# Patient Record
Sex: FEMALE | Race: WHITE | Employment: FULL TIME | URBAN - METROPOLITAN AREA
[De-identification: names, ages, dates, MRNs, and addresses within clinical notes are randomized per-mention and may not be internally consistent; named-entity substitution may affect disease eponyms.]

---

## 2017-09-15 ENCOUNTER — HOSPITAL ENCOUNTER (OUTPATIENT)
Dept: GENERAL RADIOLOGY | Age: 62
Discharge: HOME | End: 2017-09-15
Attending: NURSE PRACTITIONER | Admitting: NURSE PRACTITIONER

## 2017-09-15 DIAGNOSIS — R06.02 BREATH SHORTNESS: ICD-10-CM

## 2017-09-15 DIAGNOSIS — R05.9 COUGH: ICD-10-CM

## 2017-10-02 ENCOUNTER — HOSPITAL ENCOUNTER (OUTPATIENT)
Dept: PULMONOLOGY | Age: 62
Discharge: HOME | End: 2017-10-02
Attending: NURSE PRACTITIONER | Admitting: NURSE PRACTITIONER

## 2022-01-01 ENCOUNTER — CLINICAL DOCUMENTATION (OUTPATIENT)
Dept: ONCOLOGY | Age: 67
End: 2022-01-01

## 2022-01-01 ENCOUNTER — ANESTHESIA EVENT (OUTPATIENT)
Dept: ENDOSCOPY | Age: 67
DRG: 374 | End: 2022-01-01
Payer: COMMERCIAL

## 2022-01-01 ENCOUNTER — HOSPITAL ENCOUNTER (INPATIENT)
Age: 67
LOS: 7 days | Discharge: HOME HEALTH | DRG: 374 | End: 2022-03-31
Attending: EMERGENCY MEDICINE | Admitting: INTERNAL MEDICINE
Payer: COMMERCIAL

## 2022-01-01 ENCOUNTER — APPOINTMENT (OUTPATIENT)
Dept: INTERVENTIONAL RADIOLOGY/VASCULAR | Age: 67
DRG: 374 | End: 2022-01-01
Payer: COMMERCIAL

## 2022-01-01 ENCOUNTER — HOSPITAL ENCOUNTER (OUTPATIENT)
Dept: PET IMAGING | Age: 67
Discharge: HOME | End: 2022-04-13
Payer: COMMERCIAL

## 2022-01-01 ENCOUNTER — TELEPHONE (OUTPATIENT)
Dept: ONCOLOGY | Age: 67
End: 2022-01-01

## 2022-01-01 ENCOUNTER — APPOINTMENT (OUTPATIENT)
Dept: GENERAL RADIOLOGY | Age: 67
DRG: 374 | End: 2022-01-01
Payer: COMMERCIAL

## 2022-01-01 ENCOUNTER — OFFICE VISIT (OUTPATIENT)
Dept: ONCOLOGY | Age: 67
End: 2022-01-01
Payer: COMMERCIAL

## 2022-01-01 ENCOUNTER — APPOINTMENT (OUTPATIENT)
Dept: ULTRASOUND IMAGING | Age: 67
DRG: 374 | End: 2022-01-01
Payer: COMMERCIAL

## 2022-01-01 ENCOUNTER — APPOINTMENT (OUTPATIENT)
Dept: CT IMAGING | Age: 67
DRG: 374 | End: 2022-01-01
Payer: COMMERCIAL

## 2022-01-01 ENCOUNTER — ANESTHESIA (OUTPATIENT)
Dept: ENDOSCOPY | Age: 67
DRG: 374 | End: 2022-01-01
Payer: COMMERCIAL

## 2022-01-01 ENCOUNTER — HOSPITAL ENCOUNTER (OUTPATIENT)
Dept: INFUSION THERAPY | Age: 67
Discharge: HOME | End: 2022-04-19

## 2022-01-01 VITALS
RESPIRATION RATE: 18 BRPM | SYSTOLIC BLOOD PRESSURE: 112 MMHG | HEIGHT: 65 IN | OXYGEN SATURATION: 97 % | BODY MASS INDEX: 26.79 KG/M2 | TEMPERATURE: 95.1 F | DIASTOLIC BLOOD PRESSURE: 76 MMHG | HEART RATE: 121 BPM

## 2022-01-01 VITALS
DIASTOLIC BLOOD PRESSURE: 77 MMHG | OXYGEN SATURATION: 95 % | WEIGHT: 194.1 LBS | HEIGHT: 65 IN | SYSTOLIC BLOOD PRESSURE: 132 MMHG | BODY MASS INDEX: 32.34 KG/M2 | RESPIRATION RATE: 16 BRPM | HEART RATE: 82 BPM | TEMPERATURE: 97.7 F

## 2022-01-01 VITALS
HEIGHT: 65 IN | TEMPERATURE: 95.7 F | OXYGEN SATURATION: 96 % | RESPIRATION RATE: 18 BRPM | DIASTOLIC BLOOD PRESSURE: 72 MMHG | BODY MASS INDEX: 26.82 KG/M2 | HEART RATE: 115 BPM | WEIGHT: 161 LBS | SYSTOLIC BLOOD PRESSURE: 119 MMHG

## 2022-01-01 VITALS — OXYGEN SATURATION: 96 % | DIASTOLIC BLOOD PRESSURE: 65 MMHG | SYSTOLIC BLOOD PRESSURE: 114 MMHG

## 2022-01-01 VITALS
SYSTOLIC BLOOD PRESSURE: 179 MMHG | OXYGEN SATURATION: 96 % | DIASTOLIC BLOOD PRESSURE: 81 MMHG | RESPIRATION RATE: 25 BRPM

## 2022-01-01 DIAGNOSIS — C18.9 METASTATIC COLON CANCER TO LIVER (HCC): Primary | ICD-10-CM

## 2022-01-01 DIAGNOSIS — K76.9 LIVER LESION: ICD-10-CM

## 2022-01-01 DIAGNOSIS — K76.9 LIVER LESION: Primary | ICD-10-CM

## 2022-01-01 DIAGNOSIS — C18.7 MALIGNANT NEOPLASM OF SIGMOID COLON (HCC): ICD-10-CM

## 2022-01-01 DIAGNOSIS — C76.2 ABDOMINAL CARCINOMATOSIS (HCC): Primary | ICD-10-CM

## 2022-01-01 DIAGNOSIS — C78.7 METASTATIC COLON CANCER TO LIVER (HCC): Primary | ICD-10-CM

## 2022-01-01 DIAGNOSIS — K92.1 GASTROINTESTINAL HEMORRHAGE WITH MELENA: ICD-10-CM

## 2022-01-01 DIAGNOSIS — R10.30 LOWER ABDOMINAL PAIN: ICD-10-CM

## 2022-01-01 LAB
ABO/RH: NORMAL
ALBUMIN SERPL-MCNC: 2.7 GM/DL (ref 3.4–5)
ALBUMIN SERPL-MCNC: 3 GM/DL (ref 3.4–5)
ALBUMIN SERPL-MCNC: 3 GM/DL (ref 3.4–5)
ALBUMIN SERPL-MCNC: 3.1 GM/DL (ref 3.4–5)
ALBUMIN SERPL-MCNC: 3.2 GM/DL (ref 3.4–5)
ALBUMIN SERPL-MCNC: 3.6 GM/DL (ref 3.4–5)
ALP BLD-CCNC: 103 IU/L (ref 40–129)
ALP BLD-CCNC: 83 IU/L (ref 40–129)
ALP BLD-CCNC: 83 IU/L (ref 40–129)
ALP BLD-CCNC: 85 IU/L (ref 40–128)
ALP BLD-CCNC: 92 IU/L (ref 40–129)
ALP BLD-CCNC: 94 IU/L (ref 40–129)
ALT SERPL-CCNC: 10 U/L (ref 10–40)
ALT SERPL-CCNC: 10 U/L (ref 10–40)
ALT SERPL-CCNC: 13 U/L (ref 10–40)
ALT SERPL-CCNC: 15 U/L (ref 10–40)
ALT SERPL-CCNC: 17 U/L (ref 10–40)
ALT SERPL-CCNC: 17 U/L (ref 10–40)
ANION GAP SERPL CALCULATED.3IONS-SCNC: 11 MMOL/L (ref 4–16)
ANION GAP SERPL CALCULATED.3IONS-SCNC: 12 MMOL/L (ref 4–16)
ANION GAP SERPL CALCULATED.3IONS-SCNC: 13 MMOL/L (ref 4–16)
ANION GAP SERPL CALCULATED.3IONS-SCNC: 14 MMOL/L (ref 4–16)
ANION GAP SERPL CALCULATED.3IONS-SCNC: 14 MMOL/L (ref 4–16)
ANION GAP SERPL CALCULATED.3IONS-SCNC: 15 MMOL/L (ref 4–16)
ANION GAP SERPL CALCULATED.3IONS-SCNC: 9 MMOL/L (ref 4–16)
ANTIBODY SCREEN: NEGATIVE
ANTITHYROGLOBULIN AB: <0.9 IU/ML (ref 0–4)
ANTITHYROGLOBULIN AB: <0.9 IU/ML (ref 0–4)
ANTITHYROID MICORSOMAL: 1.4 IU/ML (ref 0–9)
ANTITHYROID MICORSOMAL: 1.6 IU/ML (ref 0–9)
APTT: 35.8 SECONDS (ref 25.1–37.1)
AST SERPL-CCNC: 17 IU/L (ref 15–37)
AST SERPL-CCNC: 18 IU/L (ref 15–37)
AST SERPL-CCNC: 20 IU/L (ref 15–37)
AST SERPL-CCNC: 21 IU/L (ref 15–37)
AST SERPL-CCNC: 24 IU/L (ref 15–37)
AST SERPL-CCNC: 25 IU/L (ref 15–37)
BACTERIA: NEGATIVE /HPF
BACTERIA: NEGATIVE /HPF
BASOPHILS ABSOLUTE: 0 K/CU MM
BASOPHILS RELATIVE PERCENT: 0.1 % (ref 0–1)
BASOPHILS RELATIVE PERCENT: 0.3 % (ref 0–1)
BILIRUB SERPL-MCNC: 0.4 MG/DL (ref 0–1)
BILIRUB SERPL-MCNC: 0.5 MG/DL (ref 0–1)
BILIRUB SERPL-MCNC: 0.6 MG/DL (ref 0–1)
BILIRUBIN URINE: NEGATIVE MG/DL
BILIRUBIN URINE: NEGATIVE MG/DL
BLOOD, URINE: ABNORMAL
BLOOD, URINE: ABNORMAL
BUN BLDV-MCNC: 11 MG/DL (ref 6–23)
BUN BLDV-MCNC: 12 MG/DL (ref 6–23)
BUN BLDV-MCNC: 13 MG/DL (ref 6–23)
BUN BLDV-MCNC: 6 MG/DL (ref 6–23)
BUN BLDV-MCNC: 8 MG/DL (ref 6–23)
CALCIUM SERPL-MCNC: 8.9 MG/DL (ref 8.3–10.6)
CALCIUM SERPL-MCNC: 9 MG/DL (ref 8.3–10.6)
CALCIUM SERPL-MCNC: 9.2 MG/DL (ref 8.3–10.6)
CALCIUM SERPL-MCNC: 9.3 MG/DL (ref 8.3–10.6)
CALCIUM SERPL-MCNC: 9.3 MG/DL (ref 8.3–10.6)
CALCIUM SERPL-MCNC: 9.4 MG/DL (ref 8.3–10.6)
CALCIUM SERPL-MCNC: 9.5 MG/DL (ref 8.3–10.6)
CEA: 29.9 NG/ML
CHLORIDE BLD-SCNC: 91 MMOL/L (ref 99–110)
CHLORIDE BLD-SCNC: 93 MMOL/L (ref 99–110)
CHLORIDE BLD-SCNC: 93 MMOL/L (ref 99–110)
CHLORIDE BLD-SCNC: 95 MMOL/L (ref 99–110)
CHLORIDE BLD-SCNC: 96 MMOL/L (ref 99–110)
CHLORIDE BLD-SCNC: 97 MMOL/L (ref 99–110)
CHLORIDE BLD-SCNC: 97 MMOL/L (ref 99–110)
CLARITY: CLEAR
CLARITY: CLEAR
CO2: 27 MMOL/L (ref 21–32)
CO2: 28 MMOL/L (ref 21–32)
CO2: 31 MMOL/L (ref 21–32)
CO2: 33 MMOL/L (ref 21–32)
CO2: 33 MMOL/L (ref 21–32)
COLOR: YELLOW
COLOR: YELLOW
CREAT SERPL-MCNC: 0.2 MG/DL (ref 0.6–1.1)
CREAT SERPL-MCNC: 0.3 MG/DL (ref 0.6–1.1)
DIFFERENTIAL TYPE: ABNORMAL
EOSINOPHILS ABSOLUTE: 0 K/CU MM
EOSINOPHILS ABSOLUTE: 0.1 K/CU MM
EOSINOPHILS ABSOLUTE: 0.1 K/CU MM
EOSINOPHILS RELATIVE PERCENT: 0.1 % (ref 0–3)
EOSINOPHILS RELATIVE PERCENT: 0.1 % (ref 0–3)
EOSINOPHILS RELATIVE PERCENT: 0.2 % (ref 0–3)
EOSINOPHILS RELATIVE PERCENT: 0.4 % (ref 0–3)
EOSINOPHILS RELATIVE PERCENT: 0.5 % (ref 0–3)
GFR AFRICAN AMERICAN: >60 ML/MIN/1.73M2
GFR NON-AFRICAN AMERICAN: >60 ML/MIN/1.73M2
GLUCOSE BLD-MCNC: 100 MG/DL (ref 70–99)
GLUCOSE BLD-MCNC: 111 MG/DL (ref 70–99)
GLUCOSE BLD-MCNC: 112 MG/DL (ref 70–99)
GLUCOSE BLD-MCNC: 116 MG/DL (ref 70–99)
GLUCOSE BLD-MCNC: 125 MG/DL (ref 70–99)
GLUCOSE BLD-MCNC: 125 MG/DL (ref 70–99)
GLUCOSE BLD-MCNC: 127 MG/DL (ref 70–99)
GLUCOSE BLD-MCNC: 127 MG/DL (ref 70–99)
GLUCOSE BLD-MCNC: 132 MG/DL (ref 70–99)
GLUCOSE BLD-MCNC: 133 MG/DL (ref 70–99)
GLUCOSE BLD-MCNC: 136 MG/DL (ref 70–99)
GLUCOSE BLD-MCNC: 138 MG/DL (ref 70–99)
GLUCOSE BLD-MCNC: 142 MG/DL (ref 70–99)
GLUCOSE BLD-MCNC: 148 MG/DL (ref 70–99)
GLUCOSE BLD-MCNC: 150 MG/DL (ref 70–99)
GLUCOSE BLD-MCNC: 161 MG/DL (ref 70–99)
GLUCOSE BLD-MCNC: 164 MG/DL (ref 70–99)
GLUCOSE BLD-MCNC: 173 MG/DL (ref 70–99)
GLUCOSE BLD-MCNC: 174 MG/DL (ref 70–99)
GLUCOSE BLD-MCNC: 180 MG/DL (ref 70–99)
GLUCOSE BLD-MCNC: 182 MG/DL (ref 70–99)
GLUCOSE BLD-MCNC: 189 MG/DL (ref 70–99)
GLUCOSE BLD-MCNC: 78 MG/DL (ref 70–99)
GLUCOSE BLD-MCNC: 82 MG/DL (ref 70–99)
GLUCOSE BLD-MCNC: 87 MG/DL (ref 70–99)
GLUCOSE BLD-MCNC: 88 MG/DL (ref 70–99)
GLUCOSE BLD-MCNC: 89 MG/DL (ref 70–99)
GLUCOSE BLD-MCNC: 91 MG/DL (ref 70–99)
GLUCOSE BLD-MCNC: 92 MG/DL (ref 70–99)
GLUCOSE BLD-MCNC: 95 MG/DL (ref 70–99)
GLUCOSE BLD-MCNC: 98 MG/DL (ref 70–99)
GLUCOSE, URINE: NEGATIVE MG/DL
GLUCOSE, URINE: NEGATIVE MG/DL
HCT VFR BLD CALC: 36.5 % (ref 37–47)
HCT VFR BLD CALC: 37.6 % (ref 37–47)
HCT VFR BLD CALC: 37.6 % (ref 37–47)
HCT VFR BLD CALC: 39.2 % (ref 37–47)
HCT VFR BLD CALC: 40.5 % (ref 37–47)
HCT VFR BLD CALC: 40.8 % (ref 37–47)
HCT VFR BLD CALC: 41.2 % (ref 37–47)
HCT VFR BLD CALC: 41.3 % (ref 37–47)
HCT VFR BLD CALC: 42.5 % (ref 37–47)
HCT VFR BLD CALC: 42.7 % (ref 37–47)
HEMOGLOBIN: 11.6 GM/DL (ref 12.5–16)
HEMOGLOBIN: 11.7 GM/DL (ref 12.5–16)
HEMOGLOBIN: 12.3 GM/DL (ref 12.5–16)
HEMOGLOBIN: 12.8 GM/DL (ref 12.5–16)
HEMOGLOBIN: 13.1 GM/DL (ref 12.5–16)
HEMOGLOBIN: 13.2 GM/DL (ref 12.5–16)
HEMOGLOBIN: 13.4 GM/DL (ref 12.5–16)
HEMOGLOBIN: 13.8 GM/DL (ref 12.5–16)
HEMOGLOBIN: 13.9 GM/DL (ref 12.5–16)
HEMOGLOBIN: 14.1 GM/DL (ref 12.5–16)
IMMATURE NEUTROPHIL %: 0.3 % (ref 0–0.43)
IMMATURE NEUTROPHIL %: 0.5 % (ref 0–0.43)
IMMATURE NEUTROPHIL %: 0.7 % (ref 0–0.43)
IMMATURE NEUTROPHIL %: 0.9 % (ref 0–0.43)
IMMATURE NEUTROPHIL %: 1.3 % (ref 0–0.43)
INR BLD: 1.19 INDEX
IRON: 34 UG/DL (ref 37–145)
KETONES, URINE: 15 MG/DL
KETONES, URINE: NEGATIVE MG/DL
LACTATE: 1.3 MMOL/L (ref 0.4–2)
LEUKOCYTE ESTERASE, URINE: NEGATIVE
LEUKOCYTE ESTERASE, URINE: NEGATIVE
LYMPHOCYTES ABSOLUTE: 1.1 K/CU MM
LYMPHOCYTES ABSOLUTE: 1.2 K/CU MM
LYMPHOCYTES ABSOLUTE: 1.6 K/CU MM
LYMPHOCYTES ABSOLUTE: 1.8 K/CU MM
LYMPHOCYTES ABSOLUTE: 2.1 K/CU MM
LYMPHOCYTES RELATIVE PERCENT: 10.1 % (ref 24–44)
LYMPHOCYTES RELATIVE PERCENT: 13.6 % (ref 24–44)
LYMPHOCYTES RELATIVE PERCENT: 15.2 % (ref 24–44)
LYMPHOCYTES RELATIVE PERCENT: 6.6 % (ref 24–44)
LYMPHOCYTES RELATIVE PERCENT: 8 % (ref 24–44)
MAGNESIUM: 1.7 MG/DL (ref 1.8–2.4)
MAGNESIUM: 1.8 MG/DL (ref 1.8–2.4)
MCH RBC QN AUTO: 27 PG (ref 27–31)
MCH RBC QN AUTO: 27.2 PG (ref 27–31)
MCH RBC QN AUTO: 27.4 PG (ref 27–31)
MCH RBC QN AUTO: 27.5 PG (ref 27–31)
MCH RBC QN AUTO: 27.7 PG (ref 27–31)
MCH RBC QN AUTO: 28.3 PG (ref 27–31)
MCHC RBC AUTO-ENTMCNC: 31.1 % (ref 32–36)
MCHC RBC AUTO-ENTMCNC: 31.7 % (ref 32–36)
MCHC RBC AUTO-ENTMCNC: 31.8 % (ref 32–36)
MCHC RBC AUTO-ENTMCNC: 32.7 % (ref 32–36)
MCHC RBC AUTO-ENTMCNC: 32.7 % (ref 32–36)
MCHC RBC AUTO-ENTMCNC: 33.7 % (ref 32–36)
MCV RBC AUTO: 82.1 FL (ref 78–100)
MCV RBC AUTO: 84.3 FL (ref 78–100)
MCV RBC AUTO: 85.9 FL (ref 78–100)
MCV RBC AUTO: 86.1 FL (ref 78–100)
MCV RBC AUTO: 86.6 FL (ref 78–100)
MCV RBC AUTO: 86.6 FL (ref 78–100)
MONOCYTES ABSOLUTE: 1.2 K/CU MM
MONOCYTES ABSOLUTE: 1.3 K/CU MM
MONOCYTES ABSOLUTE: 1.3 K/CU MM
MONOCYTES ABSOLUTE: 1.5 K/CU MM
MONOCYTES ABSOLUTE: 1.6 K/CU MM
MONOCYTES RELATIVE PERCENT: 10.5 % (ref 0–4)
MONOCYTES RELATIVE PERCENT: 7.5 % (ref 0–4)
MONOCYTES RELATIVE PERCENT: 9.1 % (ref 0–4)
MONOCYTES RELATIVE PERCENT: 9.1 % (ref 0–4)
MONOCYTES RELATIVE PERCENT: 9.6 % (ref 0–4)
MUCUS: ABNORMAL HPF
NITRITE URINE, QUANTITATIVE: NEGATIVE
NITRITE URINE, QUANTITATIVE: NEGATIVE
NUCLEATED RBC %: 0 %
PCT TRANSFERRIN: 18 % (ref 10–44)
PDW BLD-RTO: 12.5 % (ref 11.7–14.9)
PDW BLD-RTO: 12.6 % (ref 11.7–14.9)
PDW BLD-RTO: 12.7 % (ref 11.7–14.9)
PDW BLD-RTO: 12.7 % (ref 11.7–14.9)
PDW BLD-RTO: 12.9 % (ref 11.7–14.9)
PDW BLD-RTO: 13.1 % (ref 11.7–14.9)
PH, URINE: 6.5 (ref 5–8)
PH, URINE: 7.5 (ref 5–8)
PLATELET # BLD: 297 K/CU MM (ref 140–440)
PLATELET # BLD: 320 K/CU MM (ref 140–440)
PLATELET # BLD: 378 K/CU MM (ref 140–440)
PLATELET # BLD: 404 K/CU MM (ref 140–440)
PLATELET # BLD: 414 K/CU MM (ref 140–440)
PLATELET # BLD: 441 K/CU MM (ref 140–440)
PMV BLD AUTO: 10.1 FL (ref 7.5–11.1)
PMV BLD AUTO: 10.3 FL (ref 7.5–11.1)
PMV BLD AUTO: 10.4 FL (ref 7.5–11.1)
PMV BLD AUTO: 10.7 FL (ref 7.5–11.1)
PMV BLD AUTO: 10.9 FL (ref 7.5–11.1)
PMV BLD AUTO: 11.1 FL (ref 7.5–11.1)
POTASSIUM SERPL-SCNC: 2.8 MMOL/L (ref 3.5–5.1)
POTASSIUM SERPL-SCNC: 3.5 MMOL/L (ref 3.5–5.1)
POTASSIUM SERPL-SCNC: 3.5 MMOL/L (ref 3.5–5.1)
POTASSIUM SERPL-SCNC: 3.6 MMOL/L (ref 3.5–5.1)
POTASSIUM SERPL-SCNC: 3.7 MMOL/L (ref 3.5–5.1)
PROCALCITONIN: 0.64
PROCALCITONIN: 1.06
PROCALCITONIN: 1.82
PROTEIN UA: ABNORMAL MG/DL
PROTEIN UA: NEGATIVE MG/DL
PROTHROMBIN TIME: 15.4 SECONDS (ref 11.7–14.5)
RBC # BLD: 4.24 M/CU MM (ref 4.2–5.4)
RBC # BLD: 4.34 M/CU MM (ref 4.2–5.4)
RBC # BLD: 4.34 M/CU MM (ref 4.2–5.4)
RBC # BLD: 4.65 M/CU MM (ref 4.2–5.4)
RBC # BLD: 4.81 M/CU MM (ref 4.2–5.4)
RBC # BLD: 5.02 M/CU MM (ref 4.2–5.4)
RBC URINE: 1 /HPF (ref 0–6)
RBC URINE: 2 /HPF (ref 0–6)
SEGMENTED NEUTROPHILS ABSOLUTE COUNT: 10.2 K/CU MM
SEGMENTED NEUTROPHILS ABSOLUTE COUNT: 10.2 K/CU MM
SEGMENTED NEUTROPHILS ABSOLUTE COUNT: 11.9 K/CU MM
SEGMENTED NEUTROPHILS ABSOLUTE COUNT: 12.7 K/CU MM
SEGMENTED NEUTROPHILS ABSOLUTE COUNT: 14.7 K/CU MM
SEGMENTED NEUTROPHILS RELATIVE PERCENT: 73.8 % (ref 36–66)
SEGMENTED NEUTROPHILS RELATIVE PERCENT: 76.6 % (ref 36–66)
SEGMENTED NEUTROPHILS RELATIVE PERCENT: 78.9 % (ref 36–66)
SEGMENTED NEUTROPHILS RELATIVE PERCENT: 80.5 % (ref 36–66)
SEGMENTED NEUTROPHILS RELATIVE PERCENT: 85.2 % (ref 36–66)
SODIUM BLD-SCNC: 133 MMOL/L (ref 135–145)
SODIUM BLD-SCNC: 135 MMOL/L (ref 135–145)
SODIUM BLD-SCNC: 138 MMOL/L (ref 135–145)
SODIUM BLD-SCNC: 139 MMOL/L (ref 135–145)
SODIUM BLD-SCNC: 139 MMOL/L (ref 135–145)
SODIUM BLD-SCNC: 140 MMOL/L (ref 135–145)
SODIUM BLD-SCNC: 140 MMOL/L (ref 135–145)
SPECIFIC GRAVITY UA: 1.01 (ref 1–1.03)
SPECIFIC GRAVITY UA: 1.02 (ref 1–1.03)
SQUAMOUS EPITHELIAL: <1 /HPF
T3 FREE: 2.2 PG/ML (ref 2.3–4.2)
T3 FREE: 2.4 PG/ML (ref 2.3–4.2)
T4 FREE: 1.73 NG/DL (ref 0.9–1.8)
T4 FREE: 1.8 NG/DL (ref 0.9–1.8)
T4 FREE: 2.54 NG/DL (ref 0.9–1.8)
THYROTROPIN BINDING INHIBITORY IMMUNOGLOBULIN: 1 IU/L
THYROTROPIN BINDING INHIBITORY IMMUNOGLOBULIN: 1.18 IU/L
TOTAL IMMATURE NEUTOROPHIL: 0.04 K/CU MM
TOTAL IMMATURE NEUTOROPHIL: 0.09 K/CU MM
TOTAL IMMATURE NEUTOROPHIL: 0.11 K/CU MM
TOTAL IMMATURE NEUTOROPHIL: 0.14 K/CU MM
TOTAL IMMATURE NEUTOROPHIL: 0.18 K/CU MM
TOTAL IRON BINDING CAPACITY: 191 UG/DL (ref 250–450)
TOTAL NUCLEATED RBC: 0 K/CU MM
TOTAL PROTEIN: 5.2 GM/DL (ref 6.4–8.2)
TOTAL PROTEIN: 5.5 GM/DL (ref 6.4–8.2)
TOTAL PROTEIN: 5.6 GM/DL (ref 6.4–8.2)
TOTAL PROTEIN: 5.9 GM/DL (ref 6.4–8.2)
TOTAL PROTEIN: 6.2 GM/DL (ref 6.4–8.2)
TOTAL PROTEIN: 6.6 GM/DL (ref 6.4–8.2)
TSH HIGH SENSITIVITY: <0.01 UIU/ML (ref 0.27–4.2)
UNSATURATED IRON BINDING CAPACITY: 157 UG/DL (ref 110–370)
UROBILINOGEN, URINE: 4 MG/DL (ref 0.2–1)
UROBILINOGEN, URINE: NORMAL MG/DL (ref 0.2–1)
WBC # BLD: 13.4 K/CU MM (ref 4–10.5)
WBC # BLD: 13.9 K/CU MM (ref 4–10.5)
WBC # BLD: 14.8 K/CU MM (ref 4–10.5)
WBC # BLD: 16.1 K/CU MM (ref 4–10.5)
WBC # BLD: 17.2 K/CU MM (ref 4–10.5)
WBC # BLD: 17.3 K/CU MM (ref 4–10.5)
WBC UA: <1 /HPF (ref 0–5)
WBC UA: <1 /HPF (ref 0–5)
YEAST: ABNORMAL /HPF

## 2022-01-01 PROCEDURE — 82962 GLUCOSE BLOOD TEST: CPT

## 2022-01-01 PROCEDURE — 45380 COLONOSCOPY AND BIOPSY: CPT | Performed by: INTERNAL MEDICINE

## 2022-01-01 PROCEDURE — C9113 INJ PANTOPRAZOLE SODIUM, VIA: HCPCS | Performed by: INTERNAL MEDICINE

## 2022-01-01 PROCEDURE — 2580000003 HC RX 258: Performed by: INTERNAL MEDICINE

## 2022-01-01 PROCEDURE — 83735 ASSAY OF MAGNESIUM: CPT

## 2022-01-01 PROCEDURE — 1200000000 HC SEMI PRIVATE

## 2022-01-01 PROCEDURE — 6370000000 HC RX 637 (ALT 250 FOR IP): Performed by: HOSPITALIST

## 2022-01-01 PROCEDURE — 6370000000 HC RX 637 (ALT 250 FOR IP): Performed by: INTERNAL MEDICINE

## 2022-01-01 PROCEDURE — 2709999900 HC NON-CHARGEABLE SUPPLY: Performed by: INTERNAL MEDICINE

## 2022-01-01 PROCEDURE — 36415 COLL VENOUS BLD VENIPUNCTURE: CPT

## 2022-01-01 PROCEDURE — 6360000002 HC RX W HCPCS: Performed by: INTERNAL MEDICINE

## 2022-01-01 PROCEDURE — 88342 IMHCHEM/IMCYTCHM 1ST ANTB: CPT

## 2022-01-01 PROCEDURE — 78815 PET IMAGE W/CT SKULL-THIGH: CPT

## 2022-01-01 PROCEDURE — 80053 COMPREHEN METABOLIC PANEL: CPT

## 2022-01-01 PROCEDURE — 99231 SBSQ HOSP IP/OBS SF/LOW 25: CPT | Performed by: NURSE PRACTITIONER

## 2022-01-01 PROCEDURE — 74177 CT ABD & PELVIS W/CONTRAST: CPT

## 2022-01-01 PROCEDURE — 85027 COMPLETE CBC AUTOMATED: CPT

## 2022-01-01 PROCEDURE — 99232 SBSQ HOSP IP/OBS MODERATE 35: CPT | Performed by: INTERNAL MEDICINE

## 2022-01-01 PROCEDURE — 6360000002 HC RX W HCPCS: Performed by: NURSE PRACTITIONER

## 2022-01-01 PROCEDURE — 6370000000 HC RX 637 (ALT 250 FOR IP): Performed by: FAMILY MEDICINE

## 2022-01-01 PROCEDURE — 2500000003 HC RX 250 WO HCPCS

## 2022-01-01 PROCEDURE — 94761 N-INVAS EAR/PLS OXIMETRY MLT: CPT

## 2022-01-01 PROCEDURE — 81001 URINALYSIS AUTO W/SCOPE: CPT

## 2022-01-01 PROCEDURE — 2580000003 HC RX 258: Performed by: ANESTHESIOLOGY

## 2022-01-01 PROCEDURE — 85025 COMPLETE CBC W/AUTO DIFF WBC: CPT

## 2022-01-01 PROCEDURE — 88307 TISSUE EXAM BY PATHOLOGIST: CPT

## 2022-01-01 PROCEDURE — 6360000002 HC RX W HCPCS: Performed by: HOSPITALIST

## 2022-01-01 PROCEDURE — 84145 PROCALCITONIN (PCT): CPT

## 2022-01-01 PROCEDURE — 85730 THROMBOPLASTIN TIME PARTIAL: CPT

## 2022-01-01 PROCEDURE — 99215 OFFICE O/P EST HI 40 MIN: CPT | Performed by: INTERNAL MEDICINE

## 2022-01-01 PROCEDURE — 84439 ASSAY OF FREE THYROXINE: CPT

## 2022-01-01 PROCEDURE — 99231 SBSQ HOSP IP/OBS SF/LOW 25: CPT | Performed by: SURGERY

## 2022-01-01 PROCEDURE — 2700000000 HC OXYGEN THERAPY PER DAY

## 2022-01-01 PROCEDURE — 6370000000 HC RX 637 (ALT 250 FOR IP): Performed by: EMERGENCY MEDICINE

## 2022-01-01 PROCEDURE — 6360000004 HC RX CONTRAST MEDICATION: Performed by: NURSE PRACTITIONER

## 2022-01-01 PROCEDURE — 6360000002 HC RX W HCPCS: Performed by: NURSE ANESTHETIST, CERTIFIED REGISTERED

## 2022-01-01 PROCEDURE — 83540 ASSAY OF IRON: CPT

## 2022-01-01 PROCEDURE — 99223 1ST HOSP IP/OBS HIGH 75: CPT | Performed by: INTERNAL MEDICINE

## 2022-01-01 PROCEDURE — 85018 HEMOGLOBIN: CPT

## 2022-01-01 PROCEDURE — 2709999900 HC NON-CHARGEABLE SUPPLY

## 2022-01-01 PROCEDURE — 3700000001 HC ADD 15 MINUTES (ANESTHESIA): Performed by: INTERNAL MEDICINE

## 2022-01-01 PROCEDURE — 96365 THER/PROPH/DIAG IV INF INIT: CPT

## 2022-01-01 PROCEDURE — 88333 PATH CONSLTJ SURG CYTO XM 1: CPT

## 2022-01-01 PROCEDURE — 83550 IRON BINDING TEST: CPT

## 2022-01-01 PROCEDURE — 99214 OFFICE O/P EST MOD 30 MIN: CPT | Performed by: INTERNAL MEDICINE

## 2022-01-01 PROCEDURE — 3609027000 HC COLONOSCOPY: Performed by: INTERNAL MEDICINE

## 2022-01-01 PROCEDURE — 85014 HEMATOCRIT: CPT

## 2022-01-01 PROCEDURE — 3700000000 HC ANESTHESIA ATTENDED CARE: Performed by: INTERNAL MEDICINE

## 2022-01-01 PROCEDURE — 82378 CARCINOEMBRYONIC ANTIGEN: CPT

## 2022-01-01 PROCEDURE — 76536 US EXAM OF HEAD AND NECK: CPT

## 2022-01-01 PROCEDURE — 47000 NEEDLE BIOPSY OF LIVER PERQ: CPT

## 2022-01-01 PROCEDURE — 88341 IMHCHEM/IMCYTCHM EA ADD ANTB: CPT

## 2022-01-01 PROCEDURE — 86901 BLOOD TYPING SEROLOGIC RH(D): CPT

## 2022-01-01 PROCEDURE — 88305 TISSUE EXAM BY PATHOLOGIST: CPT

## 2022-01-01 PROCEDURE — 83605 ASSAY OF LACTIC ACID: CPT

## 2022-01-01 PROCEDURE — 80048 BASIC METABOLIC PNL TOTAL CA: CPT

## 2022-01-01 PROCEDURE — 84481 FREE ASSAY (FT-3): CPT

## 2022-01-01 PROCEDURE — 86800 THYROGLOBULIN ANTIBODY: CPT

## 2022-01-01 PROCEDURE — A9552 F18 FDG: HCPCS | Performed by: INTERNAL MEDICINE

## 2022-01-01 PROCEDURE — 71045 X-RAY EXAM CHEST 1 VIEW: CPT

## 2022-01-01 PROCEDURE — 0FB03ZX EXCISION OF LIVER, PERCUTANEOUS APPROACH, DIAGNOSTIC: ICD-10-PCS | Performed by: RADIOLOGY

## 2022-01-01 PROCEDURE — 99232 SBSQ HOSP IP/OBS MODERATE 35: CPT | Performed by: SURGERY

## 2022-01-01 PROCEDURE — 96366 THER/PROPH/DIAG IV INF ADDON: CPT

## 2022-01-01 PROCEDURE — 2500000003 HC RX 250 WO HCPCS: Performed by: NURSE ANESTHETIST, CERTIFIED REGISTERED

## 2022-01-01 PROCEDURE — 99285 EMERGENCY DEPT VISIT HI MDM: CPT

## 2022-01-01 PROCEDURE — 6360000002 HC RX W HCPCS: Performed by: EMERGENCY MEDICINE

## 2022-01-01 PROCEDURE — 6360000002 HC RX W HCPCS

## 2022-01-01 PROCEDURE — 86850 RBC ANTIBODY SCREEN: CPT

## 2022-01-01 PROCEDURE — APPNB15 APP NON BILLABLE TIME 0-15 MINS: Performed by: NURSE PRACTITIONER

## 2022-01-01 PROCEDURE — 0DBN8ZX EXCISION OF SIGMOID COLON, VIA NATURAL OR ARTIFICIAL OPENING ENDOSCOPIC, DIAGNOSTIC: ICD-10-PCS | Performed by: INTERNAL MEDICINE

## 2022-01-01 PROCEDURE — 76942 ECHO GUIDE FOR BIOPSY: CPT

## 2022-01-01 PROCEDURE — 6360000002 HC RX W HCPCS: Performed by: FAMILY MEDICINE

## 2022-01-01 PROCEDURE — 3430000000 HC RX DIAGNOSTIC RADIOPHARMACEUTICAL: Performed by: INTERNAL MEDICINE

## 2022-01-01 PROCEDURE — 94618 PULMONARY STRESS TESTING: CPT

## 2022-01-01 PROCEDURE — 45378 DIAGNOSTIC COLONOSCOPY: CPT | Performed by: INTERNAL MEDICINE

## 2022-01-01 PROCEDURE — 84443 ASSAY THYROID STIM HORMONE: CPT

## 2022-01-01 PROCEDURE — 86900 BLOOD TYPING SEROLOGIC ABO: CPT

## 2022-01-01 PROCEDURE — 0DJD8ZZ INSPECTION OF LOWER INTESTINAL TRACT, VIA NATURAL OR ARTIFICIAL OPENING ENDOSCOPIC: ICD-10-PCS | Performed by: INTERNAL MEDICINE

## 2022-01-01 PROCEDURE — 86376 MICROSOMAL ANTIBODY EACH: CPT

## 2022-01-01 PROCEDURE — 85610 PROTHROMBIN TIME: CPT

## 2022-01-01 PROCEDURE — 6370000000 HC RX 637 (ALT 250 FOR IP): Performed by: NURSE PRACTITIONER

## 2022-01-01 PROCEDURE — 3609010600 HC COLONOSCOPY POLYPECTOMY SNARE/COLD BIOPSY: Performed by: INTERNAL MEDICINE

## 2022-01-01 RX ORDER — ONDANSETRON 4 MG/1
4 TABLET, ORALLY DISINTEGRATING ORAL EVERY 8 HOURS PRN
Status: DISCONTINUED | OUTPATIENT
Start: 2022-01-01 | End: 2022-01-01 | Stop reason: HOSPADM

## 2022-01-01 RX ORDER — SODIUM CHLORIDE 0.9 % (FLUSH) 0.9 %
5-40 SYRINGE (ML) INJECTION PRN
Status: DISCONTINUED | OUTPATIENT
Start: 2022-01-01 | End: 2022-01-01 | Stop reason: HOSPADM

## 2022-01-01 RX ORDER — DRONABINOL 5 MG/1
5 CAPSULE ORAL
Qty: 60 CAPSULE | Refills: 2 | Status: SHIPPED | OUTPATIENT
Start: 2022-01-01 | End: 2022-01-01

## 2022-01-01 RX ORDER — FLUDEOXYGLUCOSE F 18 200 MCI/ML
15.56 INJECTION, SOLUTION INTRAVENOUS
Status: COMPLETED | OUTPATIENT
Start: 2022-01-01 | End: 2022-01-01

## 2022-01-01 RX ORDER — SODIUM CHLORIDE 0.9 % (FLUSH) 0.9 %
5-40 SYRINGE (ML) INJECTION EVERY 12 HOURS SCHEDULED
Status: DISCONTINUED | OUTPATIENT
Start: 2022-01-01 | End: 2022-01-01 | Stop reason: HOSPADM

## 2022-01-01 RX ORDER — DOCUSATE SODIUM 100 MG/1
200 CAPSULE, LIQUID FILLED ORAL DAILY
Status: DISCONTINUED | OUTPATIENT
Start: 2022-01-01 | End: 2022-01-01 | Stop reason: HOSPADM

## 2022-01-01 RX ORDER — SODIUM CHLORIDE 9 MG/ML
25 INJECTION, SOLUTION INTRAVENOUS PRN
Status: DISCONTINUED | OUTPATIENT
Start: 2022-01-01 | End: 2022-01-01 | Stop reason: HOSPADM

## 2022-01-01 RX ORDER — LIDOCAINE HYDROCHLORIDE 20 MG/ML
INJECTION, SOLUTION INFILTRATION; PERINEURAL PRN
Status: DISCONTINUED | OUTPATIENT
Start: 2022-01-01 | End: 2022-01-01 | Stop reason: SDUPTHER

## 2022-01-01 RX ORDER — NICOTINE 21 MG/24HR
1 PATCH, TRANSDERMAL 24 HOURS TRANSDERMAL DAILY
Status: DISCONTINUED | OUTPATIENT
Start: 2022-01-01 | End: 2022-01-01 | Stop reason: HOSPADM

## 2022-01-01 RX ORDER — PROPOFOL 10 MG/ML
INJECTION, EMULSION INTRAVENOUS PRN
Status: DISCONTINUED | OUTPATIENT
Start: 2022-01-01 | End: 2022-01-01 | Stop reason: SDUPTHER

## 2022-01-01 RX ORDER — KETOROLAC TROMETHAMINE 30 MG/ML
15 INJECTION, SOLUTION INTRAMUSCULAR; INTRAVENOUS EVERY 6 HOURS PRN
Status: CANCELLED | OUTPATIENT
Start: 2022-01-01 | End: 2022-01-01

## 2022-01-01 RX ORDER — MORPHINE SULFATE/0.9% NACL/PF 1 MG/ML
1 SYRINGE (ML) INJECTION EVERY 4 HOURS PRN
Status: DISCONTINUED | OUTPATIENT
Start: 2022-01-01 | End: 2022-01-01

## 2022-01-01 RX ORDER — PREDNISONE 20 MG/1
40 TABLET ORAL DAILY
Status: DISCONTINUED | OUTPATIENT
Start: 2022-01-01 | End: 2022-01-01

## 2022-01-01 RX ORDER — LIDOCAINE HYDROCHLORIDE 20 MG/ML
INJECTION, SOLUTION EPIDURAL; INFILTRATION; INTRACAUDAL; PERINEURAL PRN
Status: DISCONTINUED | OUTPATIENT
Start: 2022-01-01 | End: 2022-01-01 | Stop reason: SDUPTHER

## 2022-01-01 RX ORDER — SODIUM CHLORIDE 9 MG/ML
5-40 INJECTION INTRAVENOUS PRN
Status: CANCELLED | OUTPATIENT
Start: 2022-01-01

## 2022-01-01 RX ORDER — DRONABINOL 5 MG/1
5 CAPSULE ORAL
Qty: 60 CAPSULE | Refills: 2 | Status: CANCELLED | OUTPATIENT
Start: 2022-01-01 | End: 2022-06-12

## 2022-01-01 RX ORDER — SODIUM CHLORIDE, SODIUM LACTATE, POTASSIUM CHLORIDE, CALCIUM CHLORIDE 600; 310; 30; 20 MG/100ML; MG/100ML; MG/100ML; MG/100ML
INJECTION, SOLUTION INTRAVENOUS CONTINUOUS
Status: DISCONTINUED | OUTPATIENT
Start: 2022-01-01 | End: 2022-01-01

## 2022-01-01 RX ORDER — SODIUM CHLORIDE 9 MG/ML
5-250 INJECTION, SOLUTION INTRAVENOUS PRN
Status: CANCELLED | OUTPATIENT
Start: 2022-01-01

## 2022-01-01 RX ORDER — BUTALBITAL, ACETAMINOPHEN AND CAFFEINE 50; 325; 40 MG/1; MG/1; MG/1
2 TABLET ORAL ONCE
Status: COMPLETED | OUTPATIENT
Start: 2022-01-01 | End: 2022-01-01

## 2022-01-01 RX ORDER — DEXAMETHASONE 2 MG/1
2 TABLET ORAL 2 TIMES DAILY WITH MEALS
Qty: 20 TABLET | Refills: 0 | Status: SHIPPED | OUTPATIENT
Start: 2022-01-01 | End: 2022-01-01

## 2022-01-01 RX ORDER — FLUOROURACIL 50 MG/ML
400 INJECTION, SOLUTION INTRAVENOUS ONCE
Status: CANCELLED | OUTPATIENT
Start: 2022-01-01 | End: 2022-01-01

## 2022-01-01 RX ORDER — AZELASTINE 1 MG/ML
1 SPRAY, METERED NASAL 2 TIMES DAILY
Status: DISCONTINUED | OUTPATIENT
Start: 2022-01-01 | End: 2022-01-01 | Stop reason: HOSPADM

## 2022-01-01 RX ORDER — ACETAMINOPHEN 650 MG/1
325 SUPPOSITORY RECTAL EVERY 6 HOURS PRN
Status: DISCONTINUED | OUTPATIENT
Start: 2022-01-01 | End: 2022-01-01 | Stop reason: HOSPADM

## 2022-01-01 RX ORDER — PANTOPRAZOLE SODIUM 40 MG/10ML
40 INJECTION, POWDER, LYOPHILIZED, FOR SOLUTION INTRAVENOUS DAILY
Status: DISCONTINUED | OUTPATIENT
Start: 2022-01-01 | End: 2022-01-01

## 2022-01-01 RX ORDER — SODIUM CHLORIDE 0.9 % (FLUSH) 0.9 %
10 SYRINGE (ML) INJECTION PRN
Status: COMPLETED | OUTPATIENT
Start: 2022-01-01 | End: 2022-01-01

## 2022-01-01 RX ORDER — SODIUM CHLORIDE 9 MG/ML
5-250 INJECTION, SOLUTION INTRAVENOUS PRN
Status: CANCELLED | OUTPATIENT
Start: 2022-05-25

## 2022-01-01 RX ORDER — SIMETHICONE 80 MG
120 TABLET,CHEWABLE ORAL 4 TIMES DAILY
Status: DISCONTINUED | OUTPATIENT
Start: 2022-01-01 | End: 2022-01-01 | Stop reason: HOSPADM

## 2022-01-01 RX ORDER — MAGNESIUM SULFATE IN WATER 40 MG/ML
2000 INJECTION, SOLUTION INTRAVENOUS ONCE
Status: COMPLETED | OUTPATIENT
Start: 2022-01-01 | End: 2022-01-01

## 2022-01-01 RX ORDER — HEPARIN SODIUM (PORCINE) LOCK FLUSH IV SOLN 100 UNIT/ML 100 UNIT/ML
500 SOLUTION INTRAVENOUS PRN
Status: CANCELLED | OUTPATIENT
Start: 2022-05-25

## 2022-01-01 RX ORDER — ALBUTEROL SULFATE 90 UG/1
4 AEROSOL, METERED RESPIRATORY (INHALATION) PRN
Status: CANCELLED | OUTPATIENT
Start: 2022-01-01

## 2022-01-01 RX ORDER — SODIUM CHLORIDE 0.9 % (FLUSH) 0.9 %
5-40 SYRINGE (ML) INJECTION PRN
Status: CANCELLED | OUTPATIENT
Start: 2022-05-25

## 2022-01-01 RX ORDER — ONDANSETRON 8 MG/1
8 TABLET, ORALLY DISINTEGRATING ORAL EVERY 8 HOURS PRN
Qty: 45 TABLET | Refills: 0 | Status: SHIPPED | OUTPATIENT
Start: 2022-01-01 | End: 2022-05-28

## 2022-01-01 RX ORDER — ONDANSETRON 2 MG/ML
4 INJECTION INTRAMUSCULAR; INTRAVENOUS EVERY 6 HOURS PRN
Status: DISCONTINUED | OUTPATIENT
Start: 2022-01-01 | End: 2022-01-01 | Stop reason: HOSPADM

## 2022-01-01 RX ORDER — HYDROCODONE BITARTRATE AND ACETAMINOPHEN 5; 325 MG/1; MG/1
1 TABLET ORAL EVERY 8 HOURS PRN
Qty: 45 TABLET | Refills: 0 | Status: SHIPPED | OUTPATIENT
Start: 2022-01-01 | End: 2022-01-01

## 2022-01-01 RX ORDER — FAMOTIDINE 20 MG/1
20 TABLET, FILM COATED ORAL 2 TIMES DAILY
Status: DISCONTINUED | OUTPATIENT
Start: 2022-01-01 | End: 2022-01-01 | Stop reason: HOSPADM

## 2022-01-01 RX ORDER — SODIUM CHLORIDE 9 MG/ML
INJECTION, SOLUTION INTRAVENOUS CONTINUOUS
Status: CANCELLED | OUTPATIENT
Start: 2022-01-01

## 2022-01-01 RX ORDER — ONDANSETRON 2 MG/ML
8 INJECTION INTRAMUSCULAR; INTRAVENOUS
Status: CANCELLED | OUTPATIENT
Start: 2022-01-01

## 2022-01-01 RX ORDER — MORPHINE SULFATE 2 MG/ML
2 INJECTION, SOLUTION INTRAMUSCULAR; INTRAVENOUS EVERY 6 HOURS PRN
Status: DISCONTINUED | OUTPATIENT
Start: 2022-01-01 | End: 2022-01-01

## 2022-01-01 RX ORDER — TRAMADOL HYDROCHLORIDE 50 MG/1
50 TABLET ORAL EVERY 4 HOURS PRN
Qty: 30 TABLET | Refills: 0 | Status: SHIPPED | OUTPATIENT
Start: 2022-01-01 | End: 2022-01-01 | Stop reason: HOSPADM

## 2022-01-01 RX ORDER — BUTALBITAL, ACETAMINOPHEN AND CAFFEINE 50; 325; 40 MG/1; MG/1; MG/1
1 TABLET ORAL ONCE
Status: COMPLETED | OUTPATIENT
Start: 2022-01-01 | End: 2022-01-01

## 2022-01-01 RX ORDER — OXYCODONE HYDROCHLORIDE 5 MG/1
5 TABLET ORAL EVERY 6 HOURS PRN
Qty: 20 TABLET | Refills: 0 | Status: SHIPPED | OUTPATIENT
Start: 2022-01-01 | End: 2022-01-01

## 2022-01-01 RX ORDER — TRAMADOL HYDROCHLORIDE 50 MG/1
50 TABLET ORAL EVERY 6 HOURS PRN
Status: DISCONTINUED | OUTPATIENT
Start: 2022-01-01 | End: 2022-01-01

## 2022-01-01 RX ORDER — ACETAMINOPHEN 500 MG
500 TABLET ORAL 4 TIMES DAILY PRN
Qty: 360 TABLET | Refills: 1 | Status: SHIPPED | OUTPATIENT
Start: 2022-01-01 | End: 2022-01-01

## 2022-01-01 RX ORDER — FAMOTIDINE 10 MG/ML
20 INJECTION, SOLUTION INTRAVENOUS
Status: CANCELLED | OUTPATIENT
Start: 2022-01-01

## 2022-01-01 RX ORDER — SODIUM CHLORIDE 0.9 % (FLUSH) 0.9 %
5-40 SYRINGE (ML) INJECTION PRN
Status: CANCELLED | OUTPATIENT
Start: 2022-01-01

## 2022-01-01 RX ORDER — MAGNESIUM SULFATE 1 G/100ML
1000 INJECTION INTRAVENOUS ONCE
Status: COMPLETED | OUTPATIENT
Start: 2022-01-01 | End: 2022-01-01

## 2022-01-01 RX ORDER — OXYCODONE HYDROCHLORIDE AND ACETAMINOPHEN 5; 325 MG/1; MG/1
1 TABLET ORAL EVERY 4 HOURS PRN
Status: DISCONTINUED | OUTPATIENT
Start: 2022-01-01 | End: 2022-01-01 | Stop reason: HOSPADM

## 2022-01-01 RX ORDER — POTASSIUM CHLORIDE 7.45 MG/ML
10 INJECTION INTRAVENOUS ONCE
Status: COMPLETED | OUTPATIENT
Start: 2022-01-01 | End: 2022-01-01

## 2022-01-01 RX ORDER — IPRATROPIUM BROMIDE AND ALBUTEROL SULFATE 2.5; .5 MG/3ML; MG/3ML
1 SOLUTION RESPIRATORY (INHALATION) EVERY 4 HOURS PRN
Status: DISCONTINUED | OUTPATIENT
Start: 2022-01-01 | End: 2022-01-01 | Stop reason: HOSPADM

## 2022-01-01 RX ORDER — METFORMIN HYDROCHLORIDE 500 MG/1
500 TABLET, EXTENDED RELEASE ORAL
Qty: 30 TABLET | Refills: 0 | Status: SHIPPED | OUTPATIENT
Start: 2022-01-01 | End: 2022-01-01

## 2022-01-01 RX ORDER — METHYLPREDNISOLONE SODIUM SUCCINATE 40 MG/ML
40 INJECTION, POWDER, LYOPHILIZED, FOR SOLUTION INTRAMUSCULAR; INTRAVENOUS ONCE
Status: COMPLETED | OUTPATIENT
Start: 2022-01-01 | End: 2022-01-01

## 2022-01-01 RX ORDER — ALBUTEROL SULFATE 90 UG/1
2 AEROSOL, METERED RESPIRATORY (INHALATION) EVERY 6 HOURS PRN
Status: DISCONTINUED | OUTPATIENT
Start: 2022-01-01 | End: 2022-01-01 | Stop reason: HOSPADM

## 2022-01-01 RX ORDER — LEVOFLOXACIN 750 MG/1
750 TABLET ORAL DAILY
Qty: 3 TABLET | Refills: 0 | Status: SHIPPED | OUTPATIENT
Start: 2022-01-01 | End: 2022-01-01

## 2022-01-01 RX ORDER — HYDROCODONE BITARTRATE AND ACETAMINOPHEN 5; 325 MG/1; MG/1
1 TABLET ORAL EVERY 4 HOURS PRN
Qty: 60 TABLET | Refills: 0 | Status: SHIPPED | OUTPATIENT
Start: 2022-01-01 | End: 2022-05-26

## 2022-01-01 RX ORDER — MEPERIDINE HYDROCHLORIDE 50 MG/ML
12.5 INJECTION INTRAMUSCULAR; INTRAVENOUS; SUBCUTANEOUS PRN
Status: CANCELLED | OUTPATIENT
Start: 2022-01-01

## 2022-01-01 RX ORDER — ACETAMINOPHEN 325 MG/1
650 TABLET ORAL EVERY 6 HOURS PRN
Status: DISCONTINUED | OUTPATIENT
Start: 2022-01-01 | End: 2022-01-01

## 2022-01-01 RX ORDER — DEXTROSE MONOHYDRATE 50 MG/ML
5-250 INJECTION, SOLUTION INTRAVENOUS PRN
Status: CANCELLED | OUTPATIENT
Start: 2022-01-01

## 2022-01-01 RX ORDER — EPINEPHRINE 1 MG/ML
0.3 INJECTION, SOLUTION, CONCENTRATE INTRAVENOUS PRN
Status: CANCELLED | OUTPATIENT
Start: 2022-01-01

## 2022-01-01 RX ORDER — DOCUSATE SODIUM 100 MG/1
100 CAPSULE, LIQUID FILLED ORAL 2 TIMES DAILY
Qty: 60 CAPSULE | Refills: 0 | Status: SHIPPED | OUTPATIENT
Start: 2022-01-01

## 2022-01-01 RX ORDER — ACETAMINOPHEN 650 MG/1
650 SUPPOSITORY RECTAL EVERY 6 HOURS PRN
Status: DISCONTINUED | OUTPATIENT
Start: 2022-01-01 | End: 2022-01-01

## 2022-01-01 RX ORDER — PALONOSETRON 0.05 MG/ML
0.25 INJECTION, SOLUTION INTRAVENOUS ONCE
Status: CANCELLED | OUTPATIENT
Start: 2022-01-01 | End: 2022-01-01

## 2022-01-01 RX ORDER — ACETAMINOPHEN 500 MG
500 TABLET ORAL EVERY 6 HOURS PRN
Status: DISCONTINUED | OUTPATIENT
Start: 2022-01-01 | End: 2022-01-01 | Stop reason: HOSPADM

## 2022-01-01 RX ORDER — NICOTINE 21 MG/24HR
1 PATCH, TRANSDERMAL 24 HOURS TRANSDERMAL DAILY
Qty: 30 PATCH | Refills: 0 | Status: SHIPPED | OUTPATIENT
Start: 2022-01-01 | End: 2022-01-01

## 2022-01-01 RX ORDER — ACETAMINOPHEN 325 MG/1
650 TABLET ORAL
Status: CANCELLED | OUTPATIENT
Start: 2022-01-01

## 2022-01-01 RX ORDER — DIPHENHYDRAMINE HYDROCHLORIDE 50 MG/ML
50 INJECTION INTRAMUSCULAR; INTRAVENOUS
Status: CANCELLED | OUTPATIENT
Start: 2022-01-01

## 2022-01-01 RX ORDER — POTASSIUM CHLORIDE 20 MEQ/1
40 TABLET, EXTENDED RELEASE ORAL ONCE
Status: COMPLETED | OUTPATIENT
Start: 2022-01-01 | End: 2022-01-01

## 2022-01-01 RX ORDER — HEPARIN SODIUM (PORCINE) LOCK FLUSH IV SOLN 100 UNIT/ML 100 UNIT/ML
500 SOLUTION INTRAVENOUS PRN
Status: CANCELLED | OUTPATIENT
Start: 2022-01-01

## 2022-01-01 RX ORDER — IPRATROPIUM BROMIDE AND ALBUTEROL SULFATE 2.5; .5 MG/3ML; MG/3ML
1 SOLUTION RESPIRATORY (INHALATION)
Status: DISCONTINUED | OUTPATIENT
Start: 2022-01-01 | End: 2022-01-01

## 2022-01-01 RX ORDER — POTASSIUM BICARBONATE 25 MEQ/1
25 TABLET, EFFERVESCENT ORAL ONCE
Status: DISCONTINUED | OUTPATIENT
Start: 2022-01-01 | End: 2022-01-01 | Stop reason: CLARIF

## 2022-01-01 RX ORDER — SODIUM CHLORIDE 9 MG/ML
5-40 INJECTION INTRAVENOUS PRN
Status: CANCELLED | OUTPATIENT
Start: 2022-05-25

## 2022-01-01 RX ORDER — LEVOFLOXACIN 5 MG/ML
750 INJECTION, SOLUTION INTRAVENOUS EVERY 24 HOURS
Status: DISCONTINUED | OUTPATIENT
Start: 2022-01-01 | End: 2022-01-01 | Stop reason: HOSPADM

## 2022-01-01 RX ADMIN — SODIUM CHLORIDE, POTASSIUM CHLORIDE, SODIUM LACTATE AND CALCIUM CHLORIDE: 600; 310; 30; 20 INJECTION, SOLUTION INTRAVENOUS at 13:10

## 2022-01-01 RX ADMIN — SODIUM CHLORIDE, PRESERVATIVE FREE 10 ML: 5 INJECTION INTRAVENOUS at 09:43

## 2022-01-01 RX ADMIN — FAMOTIDINE 20 MG: 20 TABLET ORAL at 09:10

## 2022-01-01 RX ADMIN — TRAMADOL HYDROCHLORIDE 50 MG: 50 TABLET, COATED ORAL at 00:32

## 2022-01-01 RX ADMIN — PANTOPRAZOLE SODIUM 40 MG: 40 INJECTION, POWDER, FOR SOLUTION INTRAVENOUS at 09:34

## 2022-01-01 RX ADMIN — PANTOPRAZOLE SODIUM 40 MG: 40 INJECTION, POWDER, FOR SOLUTION INTRAVENOUS at 09:37

## 2022-01-01 RX ADMIN — FAMOTIDINE 20 MG: 20 TABLET ORAL at 21:40

## 2022-01-01 RX ADMIN — AZELASTINE HYDROCHLORIDE 1 SPRAY: 137 SPRAY, METERED NASAL at 12:52

## 2022-01-01 RX ADMIN — SODIUM CHLORIDE, PRESERVATIVE FREE 10 ML: 5 INJECTION INTRAVENOUS at 09:22

## 2022-01-01 RX ADMIN — IOPAMIDOL 75 ML: 755 INJECTION, SOLUTION INTRAVENOUS at 20:11

## 2022-01-01 RX ADMIN — HYDROMORPHONE HYDROCHLORIDE 0.5 MG: 1 INJECTION, SOLUTION INTRAMUSCULAR; INTRAVENOUS; SUBCUTANEOUS at 00:05

## 2022-01-01 RX ADMIN — PREDNISONE 40 MG: 20 TABLET ORAL at 11:28

## 2022-01-01 RX ADMIN — METOPROLOL TARTRATE 25 MG: 25 TABLET, FILM COATED ORAL at 14:28

## 2022-01-01 RX ADMIN — SODIUM CHLORIDE, PRESERVATIVE FREE 5 ML: 5 INJECTION INTRAVENOUS at 21:44

## 2022-01-01 RX ADMIN — PREDNISONE 40 MG: 20 TABLET ORAL at 09:10

## 2022-01-01 RX ADMIN — SODIUM CHLORIDE, PRESERVATIVE FREE 10 ML: 5 INJECTION INTRAVENOUS at 00:06

## 2022-01-01 RX ADMIN — SODIUM CHLORIDE, POTASSIUM CHLORIDE, SODIUM LACTATE AND CALCIUM CHLORIDE 100 ML: 600; 310; 30; 20 INJECTION, SOLUTION INTRAVENOUS at 16:04

## 2022-01-01 RX ADMIN — PANTOPRAZOLE SODIUM 40 MG: 40 INJECTION, POWDER, FOR SOLUTION INTRAVENOUS at 09:12

## 2022-01-01 RX ADMIN — POLYETHYLENE GLYCOL 3350, SODIUM SULFATE ANHYDROUS, SODIUM BICARBONATE, SODIUM CHLORIDE, POTASSIUM CHLORIDE 2000 ML: 236; 22.74; 6.74; 5.86; 2.97 POWDER, FOR SOLUTION ORAL at 18:04

## 2022-01-01 RX ADMIN — METOPROLOL TARTRATE 25 MG: 25 TABLET, FILM COATED ORAL at 08:54

## 2022-01-01 RX ADMIN — FAMOTIDINE 20 MG: 20 TABLET ORAL at 12:06

## 2022-01-01 RX ADMIN — METOPROLOL TARTRATE 25 MG: 25 TABLET, FILM COATED ORAL at 20:30

## 2022-01-01 RX ADMIN — METOPROLOL TARTRATE 25 MG: 25 TABLET, FILM COATED ORAL at 09:10

## 2022-01-01 RX ADMIN — OXYCODONE AND ACETAMINOPHEN 1 TABLET: 5; 325 TABLET ORAL at 00:06

## 2022-01-01 RX ADMIN — LEVOFLOXACIN 750 MG: 5 INJECTION, SOLUTION INTRAVENOUS at 11:38

## 2022-01-01 RX ADMIN — DOCUSATE SODIUM 200 MG: 100 CAPSULE, LIQUID FILLED ORAL at 11:35

## 2022-01-01 RX ADMIN — AZELASTINE HYDROCHLORIDE 1 SPRAY: 137 SPRAY, METERED NASAL at 09:13

## 2022-01-01 RX ADMIN — LIDOCAINE HYDROCHLORIDE 100 MG: 20 INJECTION, SOLUTION INFILTRATION; PERINEURAL at 13:38

## 2022-01-01 RX ADMIN — FLUDEOXYGLUCOSE F 18 15.56 MILLICURIE: 200 INJECTION, SOLUTION INTRAVENOUS at 13:42

## 2022-01-01 RX ADMIN — SODIUM CHLORIDE, PRESERVATIVE FREE 10 ML: 5 INJECTION INTRAVENOUS at 08:42

## 2022-01-01 RX ADMIN — MORPHINE SULFATE 2 MG: 2 INJECTION, SOLUTION INTRAMUSCULAR; INTRAVENOUS at 23:47

## 2022-01-01 RX ADMIN — OXYCODONE AND ACETAMINOPHEN 1 TABLET: 5; 325 TABLET ORAL at 13:31

## 2022-01-01 RX ADMIN — SODIUM CHLORIDE, PRESERVATIVE FREE 10 ML: 5 INJECTION INTRAVENOUS at 09:28

## 2022-01-01 RX ADMIN — AZELASTINE HYDROCHLORIDE 1 SPRAY: 137 SPRAY, METERED NASAL at 20:05

## 2022-01-01 RX ADMIN — METOPROLOL TARTRATE 25 MG: 25 TABLET, FILM COATED ORAL at 22:18

## 2022-01-01 RX ADMIN — ACETAMINOPHEN 650 MG: 325 TABLET ORAL at 23:03

## 2022-01-01 RX ADMIN — BUTALBITAL, ACETAMINOPHEN AND CAFFEINE 2 TABLET: 50; 325; 40 TABLET ORAL at 02:40

## 2022-01-01 RX ADMIN — PROPOFOL 250 MG: 10 INJECTION, EMULSION INTRAVENOUS at 08:31

## 2022-01-01 RX ADMIN — MAGNESIUM CITRATE 296 ML: 1.75 LIQUID ORAL at 16:04

## 2022-01-01 RX ADMIN — MAGNESIUM CITRATE 296 ML: 1.75 LIQUID ORAL at 21:13

## 2022-01-01 RX ADMIN — MORPHINE SULFATE 2 MG: 2 INJECTION, SOLUTION INTRAMUSCULAR; INTRAVENOUS at 02:36

## 2022-01-01 RX ADMIN — ONDANSETRON 4 MG: 4 TABLET, ORALLY DISINTEGRATING ORAL at 06:46

## 2022-01-01 RX ADMIN — POTASSIUM CHLORIDE 10 MEQ: 7.45 INJECTION INTRAVENOUS at 20:54

## 2022-01-01 RX ADMIN — SODIUM CHLORIDE, PRESERVATIVE FREE 10 ML: 5 INJECTION INTRAVENOUS at 09:00

## 2022-01-01 RX ADMIN — HYDROMORPHONE HYDROCHLORIDE 0.5 MG: 1 INJECTION, SOLUTION INTRAMUSCULAR; INTRAVENOUS; SUBCUTANEOUS at 06:40

## 2022-01-01 RX ADMIN — METOPROLOL TARTRATE 25 MG: 25 TABLET, FILM COATED ORAL at 20:04

## 2022-01-01 RX ADMIN — PANTOPRAZOLE SODIUM 40 MG: 40 INJECTION, POWDER, FOR SOLUTION INTRAVENOUS at 09:22

## 2022-01-01 RX ADMIN — POTASSIUM CHLORIDE 40 MEQ: 20 TABLET, EXTENDED RELEASE ORAL at 09:10

## 2022-01-01 RX ADMIN — METHYLPREDNISOLONE SODIUM SUCCINATE 40 MG: 40 INJECTION, POWDER, FOR SOLUTION INTRAMUSCULAR; INTRAVENOUS at 10:57

## 2022-01-01 RX ADMIN — AZELASTINE HYDROCHLORIDE 1 SPRAY: 137 SPRAY, METERED NASAL at 21:57

## 2022-01-01 RX ADMIN — ACETAMINOPHEN 650 MG: 325 TABLET ORAL at 17:35

## 2022-01-01 RX ADMIN — SODIUM CHLORIDE, PRESERVATIVE FREE 10 ML: 5 INJECTION INTRAVENOUS at 02:36

## 2022-01-01 RX ADMIN — SODIUM CHLORIDE, PRESERVATIVE FREE 10 ML: 5 INJECTION INTRAVENOUS at 13:42

## 2022-01-01 RX ADMIN — PANTOPRAZOLE SODIUM 40 MG: 40 INJECTION, POWDER, FOR SOLUTION INTRAVENOUS at 08:13

## 2022-01-01 RX ADMIN — PREDNISONE 40 MG: 20 TABLET ORAL at 08:54

## 2022-01-01 RX ADMIN — BUTALBITAL, ACETAMINOPHEN AND CAFFEINE 1 TABLET: 50; 325; 40 TABLET ORAL at 21:49

## 2022-01-01 RX ADMIN — ONDANSETRON 4 MG: 2 INJECTION INTRAMUSCULAR; INTRAVENOUS at 09:12

## 2022-01-01 RX ADMIN — POLYETHYLENE GLYCOL 3350, SODIUM SULFATE ANHYDROUS, SODIUM BICARBONATE, SODIUM CHLORIDE, POTASSIUM CHLORIDE 2000 ML: 236; 22.74; 6.74; 5.86; 2.97 POWDER, FOR SOLUTION ORAL at 06:13

## 2022-01-01 RX ADMIN — SIMETHICONE CHEW TAB 80 MG 120 MG: 80 TABLET ORAL at 14:26

## 2022-01-01 RX ADMIN — LEVOFLOXACIN 750 MG: 5 INJECTION, SOLUTION INTRAVENOUS at 11:37

## 2022-01-01 RX ADMIN — METOPROLOL TARTRATE 25 MG: 25 TABLET, FILM COATED ORAL at 08:41

## 2022-01-01 RX ADMIN — METOPROLOL TARTRATE 25 MG: 25 TABLET, FILM COATED ORAL at 21:43

## 2022-01-01 RX ADMIN — SODIUM CHLORIDE, PRESERVATIVE FREE 10 ML: 5 INJECTION INTRAVENOUS at 20:04

## 2022-01-01 RX ADMIN — AZELASTINE HYDROCHLORIDE 1 SPRAY: 137 SPRAY, METERED NASAL at 08:41

## 2022-01-01 RX ADMIN — OXYCODONE AND ACETAMINOPHEN 1 TABLET: 5; 325 TABLET ORAL at 18:36

## 2022-01-01 RX ADMIN — TRAMADOL HYDROCHLORIDE 50 MG: 50 TABLET, COATED ORAL at 16:14

## 2022-01-01 RX ADMIN — MAGNESIUM SULFATE 2000 MG: 2 INJECTION INTRAVENOUS at 20:41

## 2022-01-01 RX ADMIN — ACETAMINOPHEN 650 MG: 325 TABLET ORAL at 08:54

## 2022-01-01 RX ADMIN — ACETAMINOPHEN 650 MG: 325 TABLET ORAL at 17:26

## 2022-01-01 RX ADMIN — OXYCODONE AND ACETAMINOPHEN 1 TABLET: 5; 325 TABLET ORAL at 09:10

## 2022-01-01 RX ADMIN — PANTOPRAZOLE SODIUM 40 MG: 40 INJECTION, POWDER, FOR SOLUTION INTRAVENOUS at 09:28

## 2022-01-01 RX ADMIN — AZELASTINE HYDROCHLORIDE 1 SPRAY: 137 SPRAY, METERED NASAL at 09:34

## 2022-01-01 RX ADMIN — ACETAMINOPHEN 650 MG: 325 TABLET ORAL at 09:34

## 2022-01-01 RX ADMIN — SODIUM CHLORIDE, PRESERVATIVE FREE 10 ML: 5 INJECTION INTRAVENOUS at 20:36

## 2022-01-01 RX ADMIN — PROPOFOL 300 MG: 10 INJECTION, EMULSION INTRAVENOUS at 13:38

## 2022-01-01 RX ADMIN — OXYCODONE AND ACETAMINOPHEN 1 TABLET: 5; 325 TABLET ORAL at 04:26

## 2022-01-01 RX ADMIN — HYDROMORPHONE HYDROCHLORIDE 0.5 MG: 1 INJECTION, SOLUTION INTRAMUSCULAR; INTRAVENOUS; SUBCUTANEOUS at 02:28

## 2022-01-01 RX ADMIN — SODIUM CHLORIDE, PRESERVATIVE FREE 10 ML: 5 INJECTION INTRAVENOUS at 21:50

## 2022-01-01 RX ADMIN — MORPHINE SULFATE 2 MG: 2 INJECTION, SOLUTION INTRAMUSCULAR; INTRAVENOUS at 06:20

## 2022-01-01 RX ADMIN — ONDANSETRON 4 MG: 2 INJECTION INTRAMUSCULAR; INTRAVENOUS at 06:26

## 2022-01-01 RX ADMIN — Medication 1 MG: at 10:48

## 2022-01-01 RX ADMIN — SODIUM CHLORIDE, PRESERVATIVE FREE 10 ML: 5 INJECTION INTRAVENOUS at 22:22

## 2022-01-01 RX ADMIN — AZELASTINE HYDROCHLORIDE 1 SPRAY: 137 SPRAY, METERED NASAL at 08:54

## 2022-01-01 RX ADMIN — PREDNISONE 40 MG: 20 TABLET ORAL at 09:34

## 2022-01-01 RX ADMIN — METOPROLOL TARTRATE 25 MG: 25 TABLET, FILM COATED ORAL at 09:43

## 2022-01-01 RX ADMIN — SODIUM CHLORIDE, PRESERVATIVE FREE 10 ML: 5 INJECTION INTRAVENOUS at 08:54

## 2022-01-01 RX ADMIN — AZELASTINE HYDROCHLORIDE 1 SPRAY: 137 SPRAY, METERED NASAL at 20:30

## 2022-01-01 RX ADMIN — SODIUM CHLORIDE, PRESERVATIVE FREE 10 ML: 5 INJECTION INTRAVENOUS at 02:46

## 2022-01-01 RX ADMIN — LIDOCAINE HYDROCHLORIDE 100 MG: 20 INJECTION, SOLUTION EPIDURAL; INFILTRATION; INTRACAUDAL; PERINEURAL at 08:31

## 2022-01-01 RX ADMIN — ONDANSETRON 4 MG: 2 INJECTION INTRAMUSCULAR; INTRAVENOUS at 17:32

## 2022-01-01 RX ADMIN — Medication 1 MG: at 15:48

## 2022-01-01 RX ADMIN — MAGNESIUM SULFATE HEPTAHYDRATE 1000 MG: 1 INJECTION, SOLUTION INTRAVENOUS at 09:09

## 2022-01-01 RX ADMIN — ONDANSETRON 4 MG: 2 INJECTION INTRAMUSCULAR; INTRAVENOUS at 23:52

## 2022-01-01 RX ADMIN — ACETAMINOPHEN 650 MG: 325 TABLET ORAL at 16:56

## 2022-01-01 RX ADMIN — PREDNISONE 40 MG: 20 TABLET ORAL at 08:55

## 2022-01-01 RX ADMIN — ONDANSETRON 4 MG: 2 INJECTION INTRAMUSCULAR; INTRAVENOUS at 02:45

## 2022-01-01 RX ADMIN — POTASSIUM BICARBONATE 20 MEQ: 782 TABLET, EFFERVESCENT ORAL at 20:54

## 2022-01-01 RX ADMIN — PREDNISONE 40 MG: 20 TABLET ORAL at 08:41

## 2022-01-01 RX ADMIN — SODIUM CHLORIDE, PRESERVATIVE FREE 10 ML: 5 INJECTION INTRAVENOUS at 10:05

## 2022-01-01 ASSESSMENT — PULMONARY FUNCTION TESTS
PIF_VALUE: 0
PIF_VALUE: 1
PIF_VALUE: 1
PIF_VALUE: 0
PIF_VALUE: 1
PIF_VALUE: 0
PIF_VALUE: 1
PIF_VALUE: 0
PIF_VALUE: 3
PIF_VALUE: 1
PIF_VALUE: 0
PIF_VALUE: 0
PIF_VALUE: 1
PIF_VALUE: 0
PIF_VALUE: 1
PIF_VALUE: 1
PIF_VALUE: 2
PIF_VALUE: 1
PIF_VALUE: 0
PIF_VALUE: 1
PIF_VALUE: 0
PIF_VALUE: 0
PIF_VALUE: 1
PIF_VALUE: 0
PIF_VALUE: 1
PIF_VALUE: 0
PIF_VALUE: 0
PIF_VALUE: 1
PIF_VALUE: 0

## 2022-01-01 ASSESSMENT — PAIN SCALES - GENERAL
PAINLEVEL_OUTOF10: 8
PAINLEVEL_OUTOF10: 7
PAINLEVEL_OUTOF10: 1
PAINLEVEL_OUTOF10: 4
PAINLEVEL_OUTOF10: 8
PAINLEVEL_OUTOF10: 9
PAINLEVEL_OUTOF10: 3
PAINLEVEL_OUTOF10: 10
PAINLEVEL_OUTOF10: 6
PAINLEVEL_OUTOF10: 10
PAINLEVEL_OUTOF10: 6
PAINLEVEL_OUTOF10: 6
PAINLEVEL_OUTOF10: 2
PAINLEVEL_OUTOF10: 2
PAINLEVEL_OUTOF10: 0
PAINLEVEL_OUTOF10: 0
PAINLEVEL_OUTOF10: 5
PAINLEVEL_OUTOF10: 10
PAINLEVEL_OUTOF10: 8
PAINLEVEL_OUTOF10: 0
PAINLEVEL_OUTOF10: 3
PAINLEVEL_OUTOF10: 8
PAINLEVEL_OUTOF10: 2
PAINLEVEL_OUTOF10: 0
PAINLEVEL_OUTOF10: 2
PAINLEVEL_OUTOF10: 3
PAINLEVEL_OUTOF10: 0
PAINLEVEL_OUTOF10: 10
PAINLEVEL_OUTOF10: 7
PAINLEVEL_OUTOF10: 5
PAINLEVEL_OUTOF10: 10
PAINLEVEL_OUTOF10: 7
PAINLEVEL_OUTOF10: 7
PAINLEVEL_OUTOF10: 10
PAINLEVEL_OUTOF10: 9
PAINLEVEL_OUTOF10: 7
PAINLEVEL_OUTOF10: 8
PAINLEVEL_OUTOF10: 2
PAINLEVEL_OUTOF10: 4
PAINLEVEL_OUTOF10: 5
PAINLEVEL_OUTOF10: 0
PAINLEVEL_OUTOF10: 4
PAINLEVEL_OUTOF10: 8
PAINLEVEL_OUTOF10: 10
PAINLEVEL_OUTOF10: 5
PAINLEVEL_OUTOF10: 5
PAINLEVEL_OUTOF10: 4
PAINLEVEL_OUTOF10: 5

## 2022-01-01 ASSESSMENT — PAIN DESCRIPTION - PROGRESSION
CLINICAL_PROGRESSION: NOT CHANGED
CLINICAL_PROGRESSION: NOT CHANGED
CLINICAL_PROGRESSION: GRADUALLY WORSENING

## 2022-01-01 ASSESSMENT — LIFESTYLE VARIABLES
SMOKING_STATUS: 1
SMOKING_STATUS: 1

## 2022-01-01 ASSESSMENT — PAIN DESCRIPTION - FREQUENCY
FREQUENCY: INTERMITTENT
FREQUENCY: INTERMITTENT
FREQUENCY: CONTINUOUS
FREQUENCY: INTERMITTENT
FREQUENCY: CONTINUOUS
FREQUENCY: INTERMITTENT

## 2022-01-01 ASSESSMENT — PAIN DESCRIPTION - ONSET
ONSET: ON-GOING

## 2022-01-01 ASSESSMENT — PAIN DESCRIPTION - LOCATION
LOCATION: ABDOMEN

## 2022-01-01 ASSESSMENT — PATIENT HEALTH QUESTIONNAIRE - PHQ9
SUM OF ALL RESPONSES TO PHQ QUESTIONS 1-9: 1
SUM OF ALL RESPONSES TO PHQ9 QUESTIONS 1 & 2: 1
SUM OF ALL RESPONSES TO PHQ QUESTIONS 1-9: 1
1. LITTLE INTEREST OR PLEASURE IN DOING THINGS: 0
2. FEELING DOWN, DEPRESSED OR HOPELESS: 1
SUM OF ALL RESPONSES TO PHQ QUESTIONS 1-9: 1
SUM OF ALL RESPONSES TO PHQ QUESTIONS 1-9: 1

## 2022-01-01 ASSESSMENT — PAIN DESCRIPTION - PAIN TYPE
TYPE: ACUTE PAIN
TYPE: CHRONIC PAIN
TYPE: ACUTE PAIN
TYPE: CHRONIC PAIN
TYPE: ACUTE PAIN

## 2022-01-01 ASSESSMENT — PAIN DESCRIPTION - DESCRIPTORS
DESCRIPTORS: ACHING
DESCRIPTORS: ACHING

## 2022-01-01 ASSESSMENT — PAIN - FUNCTIONAL ASSESSMENT: PAIN_FUNCTIONAL_ASSESSMENT: 0-10

## 2022-03-23 NOTE — ED PROVIDER NOTES
Emergency [address] EMERGENCY DEPARTMENT    Patient: Mulugeta Ann  MRN: [id number]  : 1955  Date of Evaluation: 3/23/2022  ED Provider: Alysa Hinojosa MD    Chief Complaint       Chief Complaint   Patient presents with    Abdominal Pain     x6 wks    Melena     x6wks     CRISTINA Ann is a 73 y.o. female who presents to the emergency department for evaluation of dark stools. Patient reports been usual state of health until over 6 weeks ago when symptoms restarted. No reported fevers or sick contacts no recent travel no change of diet or medications does have a history of having tubal ligation but no other abdominal surgeries. Patient reports that she has been having multiple episodes of dark stools. No reported fevers or sick contacts no recent travel she says that the pain is located throughout her entire abdomen rating up towards her chest.  Patient does endorse having some weight loss but she says that she is also been intentionally trying to lose weight as well. No reported fevers or sick contacts no family history of colon cancer which is where no history of having colonoscopy in the past.  Has not tried taking any medications for symptoms as of yet nor has she seen any other medical providers. Today she states that her symptoms continued and she became concerned. She does have an appointment scheduled in  with gastroenterology for evaluation. ROS:     At least 10 systems reviewed and otherwise acutely negative except as in the [address] Past History   History reviewed. No pertinent past medical history.   Past Surgical History:   Procedure Laterality Date    DILATION AND CURETTAGE OF UTERUS      partial    TONSILLECTOMY      TUBAL LIGATION       Social History     Socioeconomic History    Marital status:      Spouse name: None    Number of children: None    Years of education: None    Highest Height Weight   (!) 158/77 97.9 °F (36.6 °C) Oral 114 18 94 % 5' 5\" (1.651 m) 190 lb (86.2 kg)     GENERAL APPEARANCE: Awake and alert. Cooperative. No acute distress. HEAD: Normocephalic. Atraumatic. EYES: Sclera anicteric. Pupils equal round reactive to light extraocular movements are intact  ENT: Tolerates saliva. No trismus. Moist mucous membranes  NECK: Supple. Trachea midline. No meningismus  CARDIO: RRR. Radial pulse 2+. No murmurs rubs or gallops appreciated  LUNGS: Respirations unlabored. CTAB. No accessory muscle usage noted. No wheezes rales rhonchi or stridor. ABDOMEN: Soft. Non-distended. Non-tender. No tenderness in right upper quadrant or right lower quadrant to deep palpation  EXTREMITIES: No acute deformities. No unilateral leg swelling or tenderness behind either one of calves  SKIN: Warm and dry. No erythema edema or rashes appreciated  NEUROLOGICAL:  Cranial nerves II through XII grossly intact. No gross facial drooping. Moves all 4 extremities spontaneously. PSYCHIATRIC: Normal mood. Alert and oriented x3. No reported active suicidality or homicidality.     Diagnostics   Labs:  Results for orders placed or performed during the hospital encounter of 03/23/22   CBC with Auto Differential   Result Value Ref Range    WBC 13.4 (H) 4.0 - 10.5 K/CU MM    RBC 5.02 4.2 - 5.4 M/CU MM    Hemoglobin 13.9 12.5 - 16.0 GM/DL    Hematocrit 41.2 37 - 47 %    MCV 82.1 78 - 100 FL    MCH 27.7 27 - 31 PG    MCHC 33.7 32.0 - 36.0 %    RDW 12.5 11.7 - 14.9 %    Platelets 629 479 - 415 K/CU MM    MPV 10.3 7.5 - 11.1 FL    Differential Type AUTOMATED DIFFERENTIAL     Segs Relative 76.6 (H) 36 - 66 %    Lymphocytes % 13.6 (L) 24 - 44 %    Monocytes % 9.1 (H) 0 - 4 %    Eosinophils % 0.1 0 - 3 %    Basophils % 0.3 0 - 1 %    Segs Absolute 10.2 K/CU MM    Lymphocytes Absolute 1.8 K/CU MM    Monocytes Absolute 1.2 K/CU MM    Eosinophils Absolute 0.0 K/CU MM    Basophils Absolute 0.0 K/CU MM    Nucleated RBC % 0.0 %    Total Nucleated RBC 0.0 K/CU MM    Total Immature Neutrophil 0.04 K/CU MM    Immature Neutrophil % 0.3 0 - 0.43 %   Comprehensive Metabolic Panel w/ Reflex to MG   Result Value Ref Range    Sodium 133 (L) 135 - 145 MMOL/L    Potassium 2.8 (LL) 3.5 - 5.1 MMOL/L    Chloride 91 (L) 99 - 110 mMol/L    CO2 28 21 - 32 MMOL/L    BUN 8 6 - 23 MG/DL    CREATININE 0.3 (L) 0.6 - 1.1 MG/DL    Glucose 125 (H) 70 - 99 MG/DL    Calcium 9.5 8.3 - 10.6 MG/DL    Albumin 3.6 3.4 - 5.0 GM/DL    Total Protein 6.6 6.4 - 8.2 GM/DL    Total Bilirubin 0.4 0.0 - 1.0 MG/DL    ALT 15 10 - 40 U/L    AST 21 15 - 37 IU/L    Alkaline Phosphatase 92 40 - 129 IU/L    GFR Non-African American >60 >60 mL/min/1.73m2    GFR African American >60 >60 mL/min/1.73m2    Anion Gap 14 4 - 16   Lactic Acid   Result Value Ref Range    Lactate 1.3 0.4 - 2.0 mMOL/L   Magnesium   Result Value Ref Range    Magnesium 1.7 (L) 1.8 - 2.4 mg/dl     Radiographs:  CT ABDOMEN PELVIS W IV CONTRAST Additional Contrast? None    Result Date: 3/23/2022  EXAM: CT Abdomen and Pelvis With Intravenous Contrast EXAM DATE/TIME: 3/23/2022 7:53 pm CLINICAL HISTORY: ORDERING SYSTEM PROVIDED  lower abdominal pain  TECHNOLOGIST PROVIDED HISTORY:  Additional Contrast?->None  Reason for exam:->lower abdominal pain Decision Support Exception - unselect if not a suspected or confirmed emergency medical condition->Emergency Medical Condition (MA)  Reason for Exam: lower abdominal pain TECHNIQUE: Axial computed tomography images of the abdomen and pelvis with intravenous contrast.  This CT exam was performed using one or more of the following dose reduction techniques:  automated exposure control, adjustment of the mA and/or kV according to patient size, and/or use of iterative reconstruction technique. COMPARISON: No relevant prior studies available. FINDINGS: Lung bases:  Calcified granuloma in the posterior right lung base. No acute abnormality of the lower chest is noted. ABDOMEN: Liver:  Multiple hypodense heterogeneous liver masses consistent with diffuse metastatic disease. Largest somewhat discrete mass measures 6.2 cm. Gallbladder and bile ducts:  Distended gallbladder the probable cholelithiasis represented by the Franklin Joanne gas sign. There may be some mural calcification about the fundus of the gallbladder. Ring like calcification measuring 1.5 cm in the area of the gallbladder neck could represent additional wall calcification rib partially calcified gallstone. No ductal dilation. Pancreas:  No acute findings. No mass. No ductal dilation. Spleen:  No acute findings. No splenomegaly. Adrenals:  No acute findings. No mass. Kidneys and ureters:  No acute findings. No solid mass. No hydronephrosis. Stomach and bowel:  Diverticulosis without evidence of definite acute diverticulitis. No obstruction. PELVIS: Appendix:  No findings to suggest acute appendicitis. Bladder:  No acute findings. No mass. Reproductive:  Unremarkable as visualized. ABDOMEN and PELVIS: Intraperitoneal space:  Diffuse peritoneal metastasis noted about the margin of the liver, throughout the omentum and mesentery and within the pelvis. . Largest of these is anteromedial the gallbladder measuring 3.5 cm. Small amount of free fluid noted within the posterior pelvis. No free air. Bones/joints:  No acute fracture. No dislocation. Soft tissues:  No acute findings. Vasculature:  No acute findings. No abdominal aortic aneurysm. Lymph nodes:  Abnormal appearance to the mid and distal sigmoid colon with irregular wall thickening. This may represent a primary colon carcinoma accounting for the metastatic disease noted elsewhere. Metastatic focus or enlarged lymph node is seen medial to this area measuring 3 cm in size. Prominent upper retroperitoneal lymph nodes may also be metastatic.      1.  Note is made of diffuse liver metastasis and diffuse peritoneal and omental and mesenteric metastasis as well as Akhil Niko on 03/23/22 at 613 Naomi Zhao 634    03/23/22 2000 03/23/22 1958  potassium chloride 10 mEq/100 mL IVPB (Peripheral Line)  ONCE         Last MAR action: New Bag - by Joseph Juan Antonio on 03/23/22 at 2054 ANGELICA ANNA    03/23/22 2000 03/23/22 1958  magnesium sulfate 2000 mg in 50 mL IVPB premix  ONCE         Last MAR action: New Bag - by Joseph Romano on 03/23/22 at 2041 Thermon Camera          Final Impression      1. Lower abdominal pain    2. Malignant neoplasm of sigmoid colon ([address]    3. Abdominal carcinomatosis ([address]    4. Gastrointestinal hemorrhage with melena      DISPOSITION           This patient was cared for in the setting of the COVID-19 pandemic, with nationwide stress on resources and staffing.     (Please note that portions of this note may have been completed with a voice recognition program. Efforts were made to edit the dictations but occasionally words are mis-transcribed.)    Claudette Saldivar MD  [address] Mendez MD  03/24/22 2900

## 2022-03-23 NOTE — ED PROVIDER NOTES
As physician-in-triage, I performed a medical screening history and physical exam on this patient. HISTORY OF PRESENT ILLNESS  Veronica Alfredo is a 73 y.o. female presents with lower abdominal pain. Patient stated it was a sharp pain from pelvic area radiated to upper abdomen. Patient stated it was intermittent pain. Sometimes cough worsening pain. Associate with frequency urinating. No dysuria. Patient does reported the bowel movement was soft and liquid for a while. No chills and fever. No nausea or vomiting. Labs and abdominal CT ordered. PHYSICAL EXAM  BP (!) 158/77   Pulse 114   Temp 97.9 °F (36.6 °C) (Oral)   Resp 18   Ht 5' 5\" (1.651 m)   Wt 190 lb (86.2 kg)   SpO2 94%   BMI 31.62 kg/m²     On exam, the patient appears in no acute distress. Speech is clear. Breathing is unlabored. Moves all extremities    Comment: Please note this report has been produced using speech recognition software and may contain errors related to that system including errors in grammar, punctuation, and spelling, as well as words and phrases that may be inappropriate. If there are any questions or concerns please feel free to contact the dictating provider for clarification.      CANDELARIA El - CNP  03/23/22 9784

## 2022-03-24 PROBLEM — K92.2 GI BLEED: Status: ACTIVE | Noted: 2022-01-01

## 2022-03-24 NOTE — ED NOTES
Niece Mili Morelos called and wants us to leave her number with the patient [phone]     [facility]  03/24/22 0005

## 2022-03-24 NOTE — ANESTHESIA PRE PROCEDURE
Department of Anesthesiology  Preprocedure Note       Name:  Dinora Yanez   Age:  73 y.o.  :  1955                                          MRN:  [id number]         Date:  3/24/2022      Surgeon: Estefany Johnson):  Grace Fletcher MD    Procedure: Procedure(s):  COLONOSCOPY DIAGNOSTIC    Medications prior to admission:   Prior to Admission medications    Medication Sig Start Date End Date Taking?  Authorizing Provider   VENTOLIN  (90 Base) MCG/ACT inhaler INHALE 2 PUFFS PO Q 4 H PRN FOR WHEEZING  Patient not taking: Reported on 3/24/2022 9/8/17   Historical Provider, MD   metFORMIN (GLUCOPHAGE) 500 MG tablet TK 1 T PO QD WC  Patient not taking: Reported on 3/24/2022 9/21/17   Historical Provider, MD   INCRUSE ELLIPTA 62.5 MCG/INH AEPB INL 1 PUFF PO QD  Patient not taking: Reported on 3/24/2022 10/12/17   Historical Provider, MD       Current medications:    Current Facility-Administered Medications   Medication Dose Route Frequency Provider Last Rate Last Admin    sodium chloride flush 0.9 % injection 5-40 mL  5-40 mL IntraVENous 2 times per day Charis Pelayo MD   10 mL at 22 0318    sodium chloride flush 0.9 % injection 5-40 mL  5-40 mL IntraVENous PRN Charis Pelayo MD   10 mL at 22 0246    0.9 % sodium chloride infusion  25 mL IntraVENous PRN Charis Pelayo MD        ondansetron (ZOFRAN-ODT) disintegrating tablet 4 mg  4 mg Oral Q8H PRN Charis Pelayo MD        Or    ondansetron [facility]) injection 4 mg  4 mg IntraVENous Q6H PRN Charis Pelayo MD   4 mg at 22 0245    acetaminophen (TYLENOL) tablet 650 mg  650 mg Oral Q6H PRN Charis Pelayo MD        Or    acetaminophen (TYLENOL) suppository 650 mg  650 mg Rectal Q6H PRN Charis Pelayo MD        pantoprazole (PROTONIX) injection 40 mg  40 mg IntraVENous Daily Charis Pelayo MD   40 mg at 22 0922    nicotine (NICODERM CQ) 21 MG/24HR 1 patch  1 patch TransDERmal Daily Brittany Rojas MD   1 patch at 03/24/22 0921    albuterol sulfate  (90 Base) MCG/ACT inhaler 2 puff  2 puff Inhalation Q6H PRN Brittany Rojas MD        polyethylene glycol (GoLYTELY) solution 2,000 mL  2,000 mL Oral Once Justin FlowMD Kaylah ON 3/25/2022] polyethylene glycol (GoLYTELY) solution 2,000 mL  2,000 mL Oral Once Justin Flow, MD           Allergies:  No Known Allergies    Problem List:    Patient Active Problem List   Diagnosis Code    GI bleed K92.2       Past Medical History:  History reviewed. No pertinent past medical history. Past Surgical History:        Procedure Laterality Date    DILATION AND CURETTAGE OF UTERUS      partial    TONSILLECTOMY      TUBAL LIGATION         Social History:    Social History     Tobacco Use    Smoking status: Former Smoker    Smokeless tobacco: Current User   Substance Use Topics    Alcohol use: No                                Ready to quit: Not Answered  Counseling given: Not Answered      Vital Signs (Current):   Vitals:    03/23/22 2215 03/24/22 0232 03/24/22 0912 03/24/22 0915   BP:  (!) 123/95 128/71    Pulse: 105 99 91    Resp: 24 18 14    Temp:  37 °C (98.6 °F) 36.9 °C (98.5 °F)    TempSrc:  Oral Oral    SpO2: 92% 92% (!) 89% 95%   Weight:  186 lb 4.8 oz (84.5 kg)     Height:  5' 5\" (1.651 m)                                                BP Readings from Last 3 Encounters:   03/24/22 128/71   11/14/17 118/78   10/20/17 138/76       NPO Status:                                                                                 BMI:   Wt Readings from Last 3 Encounters:   03/24/22 186 lb 4.8 oz (84.5 kg)   11/14/17 244 lb (110.7 kg)   10/20/17 246 lb (111.6 kg)     Body mass index is 31 kg/m².     CBC:   Lab Results   Component Value Date    WBC 13.4 03/23/2022    RBC 5.02 03/23/2022    HGB 14.1 03/24/2022    HCT 42.7 03/24/2022    MCV 82.1 03/23/2022    RDW 12.5 03/23/2022     03/23/2022       CMP:   Lab Results   Component Value Date     03/24/2022    K 3.7 03/24/2022    CL 97 03/24/2022    CO2 27 03/24/2022    BUN 6 03/24/2022    CREATININE 0.3 03/24/2022    GFRAA >60 03/24/2022    LABGLOM >60 03/24/2022    GLUCOSE 116 03/24/2022    PROT 6.6 03/23/2022    CALCIUM 9.3 03/24/2022    BILITOT 0.4 03/23/2022    ALKPHOS 92 03/23/2022    AST 21 03/23/2022    ALT 15 03/23/2022       POC Tests: No results for input(s): POCGLU, POCNA, POCK, POCCL, POCBUN, POCHEMO, POCHCT in the last 72 hours. Coags: No results found for: PROTIME, INR, APTT    HCG (If Applicable): No results found for: PREGTESTUR, PREGSERUM, HCG, HCGQUANT     ABGs: No results found for: PHART, PO2ART, ZUI9ODH, QVT6EBU, BEART, Q5REVTNA     Type & Screen (If Applicable):  No results found for: LABABO, LABRH    Drug/Infectious Status (If Applicable):  No results found for: HIV, HEPCAB    COVID-19 Screening (If Applicable): No results found for: COVID19        Anesthesia Evaluation  Patient summary reviewed  Airway:         Dental:          Pulmonary:   (+) COPD:  current smoker                           Cardiovascular:                      Neuro/Psych:               GI/Hepatic/Renal:   (+) bowel prep,           Endo/Other:    (+) hyperthyroidism::., electrolyte abnormalities, malignancy/cancer. ROS comment: Multinodular pnohti-XFY-shyawz nodule Abdominal:             Vascular: Other Findings:             Anesthesia Plan      MAC     ASA 3     (Chart review 3/24/22)  Induction: intravenous.                           CANDELARIA Vázquez - CRNA   3/24/2022

## 2022-03-24 NOTE — ED NOTES
PT refused straight cath, pt states will provide sample when she goes back to [facility].       Jeramy Hall RN  03/24/22 0020

## 2022-03-24 NOTE — ED NOTES
Pt absent of acute cardiorespiratory/neurological distress @ this time.       Annamarie Ojeda RN  03/23/22 9689

## 2022-03-24 NOTE — ED NOTES
Pt has removed oxygen and refused to put it back on. Pt educated on the need to have oxygen on, pt states that she understands that and will put it back on when she is ready.       Gaviota Rolle RN  03/23/22 6634

## 2022-03-24 NOTE — PROGRESS NOTES
73 y.o. female with COPD, not on home oxygen, obesity presented to ED with complaints of diffuse abdominal pain. The patient reported black stool, hemoglobin stable at 13, continue to monitor H&H, CT abdomen pelvis suspicious for colon cancer with metastasis, general surgery and oncology consulted, GI consulted for possible GI bleed.    Hypokalemia replaced  Continue on pantoprazole

## 2022-03-24 NOTE — ED NOTES
Pt absent of acute cardiorespiratory/neurological distress @ this time.       Vasyl Huynh RN  03/23/22 1379

## 2022-03-24 NOTE — CONSULTS
Department of Internal Medicine  Gastroenterology Consult Note  Danica Hernandez MD      Reason for Consult:  Melena    Primary Care Physician:  Braden Daley, APRN - CNP    History Obtained From:  patient    HISTORY OF PRESENT ILLNESS:              The patient is a 73 y.o.  female who presented with weight loss, diffuse lower abdominal pain and melena. She has lost about 75 pounds over the last several months. A ct of the abdomen and pelvis on admission revealed diffuse metastatic disease in the liver, peritoneal mets and irregular wall thickening of the mid and distal sigmoid colon. She has never had a colonoscopy and denies any family hx of colon ca or polyps. Past Medical History:    History reviewed. No pertinent past medical history. Past Surgical History:        Procedure Laterality Date    DILATION AND CURETTAGE OF UTERUS      partial    TONSILLECTOMY      TUBAL LIGATION         Medications Prior to Admission:    Prior to Admission medications    Medication Sig Start Date End Date Taking? Authorizing Provider   VENTOLIN  (90 Base) MCG/ACT inhaler INHALE 2 PUFFS PO Q 4 H PRN FOR WHEEZING  Patient not taking: Reported on 3/24/2022 9/8/17   Historical Provider, MD   metFORMIN (GLUCOPHAGE) 500 MG tablet TK 1 T PO QD WC  Patient not taking: Reported on 3/24/2022 9/21/17   Historical Provider, MD   INCRUSE ELLIPTA 62.5 MCG/INH AEPB INL 1 PUFF PO QD  Patient not taking: Reported on 3/24/2022 10/12/17   Historical Provider, MD       Allergies:  No Known Allergies. Social History:    TOBACCO:  Yes  ETOH:  No    Family History: No family history of gi disease. History reviewed. No pertinent family history. REVIEW OF SYSTEMS: (POSITIVES WILL BE UNDERLINED)  CONSTITUTIONAL:    Weight change,fatigue, fever, chills  EYES:  Diplopia, change in vision  EARS:  hearing loss, tinnitus, vertigo  NOSE:   epistaxis  MOUTH/THROAT:     hoarseness, sore throat.   RESPIRATORY:  SOB,  cough, sputum,

## 2022-03-24 NOTE — PLAN OF CARE
4 Eyes Skin Assessment     NAME:  Yordan Puente  YOB: 1955  MEDICAL RECORD NUMBER:  [id number]    The patient is being assess for  Admission    I agree that 2 RN's have performed a thorough Head to Toe Skin Assessment on the patient. ALL assessment sites listed below have been assessed. Areas assessed by both nurses:    Whole Body        Does the Patient have a Wound?  No       [place] Prevention initiated:  No   Wound Care Orders initiated:  No     Pressure Injury (Stage 3,4, Unstageable, DTI, NWPT, and Complex wounds) if present place consult order under [de-identified] No    New and Established Ostomies if present place consult order under : No    Nurse 1 eSignature: Electronically signed by Rajinder Rivas LPN on 3/77/38 at 0:66 AM EDT    **SHARE this note so that the co-signing nurse is able to place an eSignature**    Nurse 2 eSignature:

## 2022-03-24 NOTE — ED NOTES
Pt absent of acute cardiorespiratory/neurological distress @ this time.       Suzette Candelario RN  03/23/22 2191

## 2022-03-24 NOTE — ED NOTES
Pt absent of acute cardiorespiratory/neurological distress @ this time.       Devyn De Jesus RN  03/23/22 8347

## 2022-03-24 NOTE — PROGRESS NOTES
Comprehensive Nutrition Assessment    Type and Reason for Visit:  Initial,Positive Nutrition Screen    Nutrition Recommendations/Plan:   · Correct depleted K level   · Continue current diet, advanced as tolerated or per GI   · Monitor GI status, diet advancement, Po intakes and poc   · Trial clears oral nutrition supplement BID     Nutrition Assessment:  Admitted with GI bleed, c/o melena during admission. Pt advanced to clear liquids, was NPO at visit. Reports decreased appetite over the last few weeks, c/o ab pain x 7 weeks with sporadic, uncontrolled and irregular loose stool, and some days having multiple episodes of diarrhea every 15 minutes. She believes that LQ pain is sharper when needing a BM. Pt admits to eating poorly PTA, \"I eat lots of junk\" with poor hydration. Discussed nutrition and lifestyle modifications for colon health. CT A/P showed diffuse metastatic disease in the liver, peritoneal mets and irregular wall thickening of the mid and distal sigmoid colon. Plans for c scope tomorrow for suspected met colon ca. Follow as high nutrition risk. Malnutrition Assessment:  Malnutrition Status: At risk for malnutrition (Comment)    Context:  Acute Illness     Findings of the 6 clinical characteristics of malnutrition:  Energy Intake:  1 - 75% or less of estimated energy requirements for 7 or more days (2 months, 6-7 weeks)  Weight Loss:  No significant weight loss     Body Fat Loss:  No significant body fat loss     Muscle Mass Loss:  1 - Mild muscle mass loss    Fluid Accumulation:  Unable to assess Extremities   Strength:  Not Performed    Estimated Daily Nutrient Needs:  Energy (kcal):  1509-1575 (1.1-1.2 stress factor); Weight Used for Energy Requirements:  Current     Protein (g):  68 (at least 1.2 g/kg);  Weight Used for Protein Requirements:  Ideal        Fluid (ml/day):  5528-4516; Method Used for Fluid Requirements:  1 ml/kcal      Nutrition Related Findings:  K 2.8  H/H WNL Wounds:  None       Current Nutrition Therapies:    ADULT DIET; Clear Liquid    Anthropometric Measures:  · Height: 5' 5\" (165.1 cm)  · Current Body Weight: 186 lb 4.6 oz (84.5 kg)   · Admission Body Weight:  (stated)    · Usual Body Weight: 190 lb (86.2 kg) (stated per pt, limited wt hx)     · Ideal Body Weight: 125 lbs; % Ideal Body Weight 149 %   · BMI: 31  · Adjusted Body Weight:  ; No Adjustment   · Adjusted BMI:      · BMI Categories: Obese Class 1 (BMI 30.0-34. 9)       Nutrition Diagnosis:   · Inadequate oral intake related to altered GI structure,catabolic illness,pain as evidenced by poor intake prior to admission,NPO or clear liquid status due to medical condition    Nutrition Interventions:   Food and/or Nutrient Delivery:  Continue Current Diet,Start Oral Nutrition Supplement  Nutrition Education/Counseling:  No recommendation at this time   Coordination of Nutrition Care:  Continue to monitor while inpatient    Goals:  Pt will tolerate liquid diet status and advanced up to full liquids during stay       Nutrition Monitoring and Evaluation:   Behavioral-Environmental Outcomes:  Beliefs and Attitutes,Knowledge or Skill,Readiness for Change   Food/Nutrient Intake Outcomes:  Supplement Intake,Diet Advancement/Tolerance,Food and Nutrient Intake  Physical Signs/Symptoms Outcomes:  Biochemical Data,GI Status,Diarrhea,Meal Time Behavior,Weight,Hemodynamic Status     Discharge Planning:     Too soon to determine     Electronically signed by Jak Elias RD, LD on 3/24/22 at 3:01 PM EDT    Contact: [phone]

## 2022-03-24 NOTE — H&P
History and Physical      Name:  Marielle Romero /Age/Sex: 1955  (73 y.o. female)   MRN & CSN:  [id number] & [id number] Encounter Date/Time: 3/24/2022 1:19 AM EDT   Location:  ED28/ED-28 PCP: Annika Perez[address] [facility] Day: 2    Assessment and Plan:     #.  GI bleed  -Patient complaining of melanotic stool  -Hemoglobin 13.9.  -Monitor H&H  -Type and screen ordered. -GI consult. #.  Suspected colon cancer  -CT abdomen/pelvis-metastatic disease likely related to suspected colon cancer involving the mid to distal sigmoid colon with adjacent spread into the left pelvis versus adenopathy.  -Diffuse liver metastasis and diffuse peritoneal and omental and mesenteric metastasis. -Patient made aware of CT findings.  -Consult general surgery.  -Offered IV opioid medication for abdominal pain. Patient declined. #.  Hypokalemia and hypomagnesemia  -Patient received 20 mEq p.o., 10 mEq IV potassium, 2 g IV magnesium in ED.  -Monitor with repeat BMP. #.  COPD  -Does not appear to be in exacerbation  -Patient does not use any inhalers, not on oxygen at home.  -Albuterol HFA as needed. #.  Leukocytosis  -UA not suggestive of infection  -Chest x-ray not done. -CT abdomen/pelvis-no acute abnormality of the lower chest noted. -Monitor with repeat CBC. #.  Multinodular fensvu-HYG-hvbnra nodule  -As per the documentation-2017 patient's TSH was 0.0006 with hyperthyroidism. Patient was referred to endocrinology. Patient could not recall this.  -Check TSH. #.  Chronic tobacco dependence  -Admits to smoking 1.5 PPD. Willing to quit.  -Nicotine patch ordered. #.  Obesity with BMI 31.62. Disposition:   Current Living situation: home    Diet  n.p.o.   DVT Prophylaxis  no chemical prophylaxis as patient is complaining of melanotic stool. Code Status No Order   Surrogate Decision Maker/ POA      History from:   EMR, patient.     History of Present Illness:     Chief Complaint: GI lane Ricci is a 73 y.o. female with COPD, not on home oxygen, obesity presented to ED with complaints of diffuse abdominal pain. Patient reported having abdominal pain for the last 6 weeks, associated with diarrhea, dark stool. Patient reported having multiple bowel movements which are sometimes watery, sometimes soft. Patient has been noticing diffuse abdominal pain, mostly in the lower abdomen, associated with nausea, poor appetite. Patient reported weight loss. Patient denied any fever, chills, cough, chest pain, shortness of breath. Patient denied any urinary complaints. Patient does not have a primary care. Has last seen a physician for years ago. Does not take any medications. At presentation patient was noted to have /77, , RR 18, temp 97.9, saturating 94% on room air. Lab work significant for sodium 133, potassium 2.8, magnesium 1.7, lactic acid 1.3, random glucose 125, WBC 13.4.  UA not suggestive of infection. CT abdomen/pelvis-diffuse liver metastasis and diffuse peritoneal and omental and mesenteric metastasis, likely related to a suspected colon carcinoma involving the mid to distal sigmoid colon with adjacent spread into the left pelvis versus adenopathy. Distended gallbladder thought to contain gallstones. Patient received potassium and magnesium supplementation in ER. Review of Systems: Need 10 Elements   10 point review of systems conducted and pertinent positives and negatives as per HPI. Objective:   No intake or output data in the 24 hours ending 03/24/22 0119   Vitals:   Vitals:    03/23/22 2130 03/23/22 2145 03/23/22 2200 03/23/22 2215   BP: (!) 165/80  (!) 146/89    Pulse: 97 95 100 105   Resp: 24 17 17 24   Temp:       TempSrc:       SpO2: 93% 91% 94% 92%   Weight:       Height:           Medications Prior to Admission   Reviewed medications with patient    Prior to Admission medications    Medication Sig Start Date End Date Taking?  Authorizing Provider   VENTOLIN  (90 Base) MCG/ACT inhaler INHALE 2 PUFFS PO Q 4 H PRN FOR WHEEZING 17   Historical Provider, MD   metFORMIN (GLUCOPHAGE) 500 MG tablet TK 1 T PO QD WC 17   Historical Provider, MD RIVERA ELLIPTA 62.5 MCG/INH AEPB INL 1 PUFF PO QD 10/12/17   Historical Provider, MD       Physical Exam: Need 8 Elements   Physical Exam     GEN  -Awake, alert, NAD.   EYES   -PERRL. HENT  -MM are moist.   RESP  -LS CTA equal bilat, no wheezes, rales or rhonchi. Symmetric chest movement. No respiratory distress noted. C/V  -S1/S2 auscultated. RRR without appreciable M/R/G. No peripheral edema. GI  -Abdomen is soft, non-distended, diffuse tenderness. Obese limiting examination. + BS in all quadrants.   -No CVA tenderness. Davis catheter is not present. MS  -B/L extremities - No gross joint deformities. No swelling, intact sensation symmetrical.   SKIN  -Normal coloration, warm, dry. NEURO  - Awake, alert, oriented x 3, no focal deficits. PSYC  - Appropriate affect. Past Medical History:   Reviewed patient's past medical, surgical, social, family history and allergies. PMHx COPD  PSHX:  has a past surgical history that includes Tonsillectomy; Tubal ligation; and Dilation and curettage of uterus. Allergies: No Known Allergies  Fam HX: Family history of breast cancer on paternal side. Father  of cancer-unknown details. Soc HX: Admits to smoking 1.5 packs/day, denies any alcohol or illicit drug use.       Medications:   Medications:    Infusions:   PRN Meds:     Labs      CBC:   Recent Labs     22  1637   WBC 13.4*   HGB 13.9        BMP:    Recent Labs     22  1637   *   K 2.8*   CL 91*   CO2 28   BUN 8   CREATININE 0.3*   GLUCOSE 125*     Hepatic:   Recent Labs     22  1637   AST 21   ALT 15   BILITOT 0.4   ALKPHOS 92     Lipids: No results found for: CHOL, HDL, TRIG  Hemoglobin A1C: No results found for: LABA1C  TSH: No results found for: TSH  Troponin: No results found for: TROPONINT  Lactic Acid: No results for input(s): LACTA in the last 72 hours. BNP: No results for input(s): PROBNP in the last 72 hours. UA:No results found for: Eward Jazmin, PHUR, LABCAST, WBCUA, RBCUA, MUCUS, TRICHOMONAS, YEAST, BACTERIA, CLARITYU, SPECGRAV, LEUKOCYTESUR, UROBILINOGEN, BILIRUBINUR, BLOODU, GLUCOSEU, KETUA, AMORPHOUS  Urine Cultures: No results found for: LABURIN  Blood Cultures: No results found for: BC  No results found for: BLOODCULT2  Organism: No results found for: ORG    Imaging/Diagnostics Last 24 Hours   CT ABDOMEN PELVIS W IV CONTRAST Additional Contrast? None    Result Date: 3/23/2022  EXAM: CT Abdomen and Pelvis With Intravenous Contrast EXAM DATE/TIME: 3/23/2022 7:53 pm CLINICAL HISTORY: ORDERING SYSTEM PROVIDED  lower abdominal pain  TECHNOLOGIST PROVIDED HISTORY:  Additional Contrast?->None  Reason for exam:->lower abdominal pain Decision Support Exception - unselect if not a suspected or confirmed emergency medical condition->Emergency Medical Condition (MA)  Reason for Exam: lower abdominal pain TECHNIQUE: Axial computed tomography images of the abdomen and pelvis with intravenous contrast.  This CT exam was performed using one or more of the following dose reduction techniques:  automated exposure control, adjustment of the mA and/or kV according to patient size, and/or use of iterative reconstruction technique. COMPARISON: No relevant prior studies available. FINDINGS: Lung bases:  Calcified granuloma in the posterior right lung base. No acute abnormality of the lower chest is noted. ABDOMEN: Liver:  Multiple hypodense heterogeneous liver masses consistent with diffuse metastatic disease. Largest somewhat discrete mass measures 6.2 cm. Gallbladder and bile ducts:  Distended gallbladder the probable cholelithiasis represented by the [employer] gas sign. There may be some mural calcification about the fundus of the gallbladder.  Ring like calcification measuring 1.5 cm in the area of the gallbladder neck could represent additional wall calcification rib partially calcified gallstone. No ductal dilation. Pancreas:  No acute findings. No mass. No ductal dilation. Spleen:  No acute findings. No splenomegaly. Adrenals:  No acute findings. No mass. Kidneys and ureters:  No acute findings. No solid mass. No hydronephrosis. Stomach and bowel:  Diverticulosis without evidence of definite acute diverticulitis. No obstruction. PELVIS: Appendix:  No findings to suggest acute appendicitis. Bladder:  No acute findings. No mass. Reproductive:  Unremarkable as visualized. ABDOMEN and PELVIS: Intraperitoneal space:  Diffuse peritoneal metastasis noted about the margin of the liver, throughout the omentum and mesentery and within the pelvis. . Largest of these is anteromedial the gallbladder measuring 3.5 cm. Small amount of free fluid noted within the posterior pelvis. No free air. Bones/joints:  No acute fracture. No dislocation. Soft tissues:  No acute findings. Vasculature:  No acute findings. No abdominal aortic aneurysm. Lymph nodes:  Abnormal appearance to the mid and distal sigmoid colon with irregular wall thickening. This may represent a primary colon carcinoma accounting for the metastatic disease noted elsewhere. Metastatic focus or enlarged lymph node is seen medial to this area measuring 3 cm in size. Prominent upper retroperitoneal lymph nodes may also be metastatic. 1.  Note is made of diffuse liver metastasis and diffuse peritoneal and omental and mesenteric metastasis as well as possibly some upper retroperitoneal metastatic adenopathy. Though not definitive, this likely is related to a suspected colon carcinoma involving the mid to distal sigmoid colon with adjacent spread into the left pelvis versus adenopathy. 2.  Small amount of fluid noted within the pelvis.  3.  Somewhat distended gallbladder thought to contain gallstones, 1 of which may be within the gallbladder neck versus mural calcification. Personally reviewed Lab Studies, Imaging, and discussed case with ED physician.     Electronically signed by Rupali Nielsen MD on 3/24/2022 at 1:19 AM

## 2022-03-24 NOTE — ED NOTES
Pt's oxygen levels @ 88%, pt placed on oxygen @ 2 lpm with n/c. Pt states that she will probably not keep it on.       Magui Olsen RN  03/23/22 [id number]

## 2022-03-24 NOTE — PLAN OF CARE
Nutrition Problem #1: Inadequate oral intake  Intervention: Food and/or Nutrient Delivery: Continue Current Diet,Start Oral Nutrition Supplement  Nutritional Goals: Pt will tolerate liquid diet status and advanced up to full liquids during stay

## 2022-03-25 NOTE — CONSULTS
ONCOLOGY HEMATOLOGY CARE ([facility])  CONSULTATION REPORT    REASON FOR CONSULT  To evaluate the patient with possible metastatic colon cancer. CHIEF COMPLAINT    Chief Complaint   Patient presents with    Abdominal Pain     x6 wks    Melena     x6wks     HISTORY OF PRESENT ILLNESS   Todd Jim is a 73 y.o. female with medical history significant for COPD, not on home oxygen, obesity presented to [facility] ED on 3/23/22 with complaints of diffuse abdominal pain and melena for about 6 weeks duration. It is associated with diarrhea, dark stool. She has about 30 lbs weight loss over last 3 months. She does not have a primary care. Has last seen a physician for years ago. Does not take any medications. She never had colonoscopy before. CT abdomen/pelvis on 3/23/22 showed diffuse liver metastasis, diffuse peritoneal, omental and mesenteric metastasis, likely related to a suspected colon carcinoma involving the mid to distal sigmoid colon with adjacent spread into the left pelvis versus adenopathy. She denies family history of colon cancer. Besides above symptoms, she doesn't have any significant symptoms. PAST MEDICAL HISTORY    History reviewed. No pertinent past medical history. SURGICAL HISTORY    Past Surgical History:   Procedure Laterality Date    DILATION AND CURETTAGE OF UTERUS      partial    TONSILLECTOMY      TUBAL LIGATION         FAMILY HISTORY    History reviewed. No pertinent family history.     SOCIAL HISTORY    Social History     Socioeconomic History    Marital status:      Spouse name: None    Number of children: None    Years of education: None    Highest education level: None   Occupational History    None   Tobacco Use    Smoking status: Former Smoker    Smokeless tobacco: Current User   Substance and Sexual Activity    Alcohol use: No    Drug use: No    Sexual activity: None   Other Topics Concern    None   Social History Narrative    None     Social Determinants of Health     Financial Resource Strain:     Difficulty of Paying Living Expenses: Not on file   Food Insecurity:     Worried About Running Out of Food in the Last Year: Not on file    Kristy of Food in the Last Year: Not on file   Transportation Needs:     Lack of Transportation (Medical): Not on file    Lack of Transportation (Non-Medical): Not on file   Physical Activity:     Days of Exercise per Week: Not on file    Minutes of Exercise per Session: Not on file   Stress:     Feeling of Stress : Not on file   Social Connections:     Frequency of Communication with Friends and Family: Not on file    Frequency of Social Gatherings with Friends and Family: Not on file    Attends [employer] Services: Not on file    Active Member of [address] [place] [address] [employer] or Organizations: Not on file    Attends Club or Organization Meetings: Not on file    Marital Status: Not on file   Intimate Partner Violence:     Fear of Current or Ex-Partner: Not on file    Emotionally Abused: Not on file    Physically Abused: Not on file    Sexually Abused: Not on file   Housing Stability:     Unable to Pay for Housing in the Last Year: Not on file    Number of [place] in the Last Year: Not on file    Unstable Housing in the Last Year: Not on file       REVIEW OF SYSTEMS    Constitutional:  Denies fever or chills. Has loss of appetite and weight loss. Denies tiredness, fatigue or weakness   HEENT:  Denies swelling of neck glands  Respiratory:  Denies cough, shortness of breath or hemoptysis  Cardiovascular:  Denies chest pain, palpitations or swelling   GI: Has abdominal pain and diarrhea. Denies nausea, vomiting or constipation   Musculoskeletal:  Denies back pain   Skin:  Denies rash   Neurologic:  Denies headache, focal weakness or sensory changes   All systems negative except as marked.      PHYSICAL EXAM    Vitals: BP (!) 143/76   Pulse 84   Temp 98 °F (36.7 °C) (Oral)   Resp 16   Ht 5' 5\" (1.651 m)   Wt 185 lb 12.8 oz (84.3 kg)   SpO2 (!) 88%   BMI 30.92 kg/m²   CONSTITUTIONAL: awake, alert, cooperative, no apparent distress   EYES: pupils equal, round and reactive to light, sclera clear and conjunctiva normal  ENT: Normocephalic, without obvious abnormality, atraumatic  NECK: supple, symmetrical, no jugular venous distension and no carotid bruits   HEMATOLOGIC/LYMPHATIC: no cervical, supraclavicular or axillary lymphadenopathy   LUNGS: VBS, no wheezes, no crackles, no rhonchi, no increased work of breathing and clear to auscultation   CARDIOVASCULAR: regular rate and rhythm, normal S1 and S2, no murmur noted  ABDOMEN: normal bowel sounds x 4, soft, non-distended, non-tender, no masses palpated, no hepatosplenomgaly   MUSCULOSKELETAL: full range of motion noted, tone is normal  NEUROLOGIC: awake, alert, oriented to name, place and time. Motor skills grossly intact. SKIN: Normal skin color, texture, turgor and no jaundice. Skin appears intact   EXTREMITIES: no LE edema, no cyanosis, no leg swelling, no clubbing    LABORATORY RESULTS  CBC:   Recent Labs     03/23/22  1637 03/24/22  0343 03/24/22  0719 03/24/22  1614 03/24/22  1954   WBC 13.4*  --   --   --   --    HGB 13.9   < > 14.1 13.4 13.8     --   --   --   --     < > = values in this interval not displayed. BMP:    Recent Labs     03/23/22  1637 03/24/22  0343   * 135   K 2.8* 3.7   CL 91* 97*   CO2 28 27   BUN 8 6   CREATININE 0.3* 0.3*   GLUCOSE 125* 116*     Hepatic:   Recent Labs     03/23/22  1637   AST 21   ALT 15   BILITOT 0.4   ALKPHOS 92     INR: No results for input(s): INR in the last 72 hours. RADIOLOGY REPORTS  CT scan of the abdomen & pelvis  1.  Note is made of diffuse liver metastasis and diffuse peritoneal and   omental and mesenteric metastasis as well as possibly some upper   retroperitoneal metastatic adenopathy.  Though not definitive, this likely is   related to a suspected colon carcinoma involving the mid to distal sigmoid colon with adjacent spread into the left pelvis versus adenopathy.       2.  Small amount of fluid noted within the pelvis.       3.  Somewhat distended gallbladder thought to contain gallstones, 1 of which   may be within the gallbladder neck versus mural calcification. ASSESSMENT  Probable colon cancer with diffuse metastasis    RECOMMENDATION  Reviewed CT findings with her. She is going to have colonoscopy today and will follow up with findings and pathology. She appears to have sigmoid colon cancer with metastasis. Will request CEA level. If pathology reveal colonic adenocarcinoma, will request all RAMON study, BRAF, LXE6alj and MSI study. Will also request Dr. Mavis Brian for chemo port placement. Will consider to have base line PET/CT scan as an out patient. I answered all her questions and concerns. We will follow the patient. Thank you for allowing me to participate in the care of this very pleasant patient.

## 2022-03-25 NOTE — PROGRESS NOTES
Patient guarding with labored respirations, reporting 10/10 abdominal pain. Given morphine 1mg IV per PRN order. Physician notified, patient given dose of tramadol 50 mg PO. Support provided. Will continue to monitor.

## 2022-03-25 NOTE — ANESTHESIA PRE PROCEDURE
Department of Anesthesiology  Preprocedure Note       Name:  Pat Hernandez   Age:  73 y.o.  :  1955                                          MRN:  [id number]         Date:  3/25/2022      Surgeon: Diandra Souza):  Krystina Mandel MD    Procedure: Procedure(s):  COLONOSCOPY DIAGNOSTIC    Medications prior to admission:   Prior to Admission medications    Medication Sig Start Date End Date Taking? Authorizing Provider   VENTOLIN  (90 Base) MCG/ACT inhaler INHALE 2 PUFFS PO Q 4 H PRN FOR WHEEZING  Patient not taking: Reported on 3/24/2022 9/8/17   Historical Provider, MD   metFORMIN (GLUCOPHAGE) 500 MG tablet TK 1 T PO QD WC  Patient not taking: Reported on 3/24/2022 9/21/17   Historical Provider, MD   INCRUSE ELLIPTA 62.5 MCG/INH AEPB INL 1 PUFF PO QD  Patient not taking: Reported on 3/24/2022 10/12/17   Historical Provider, MD       Current medications:    No current facility-administered medications for this visit. No current outpatient medications on file.      Facility-Administered Medications Ordered in Other Visits   Medication Dose Route Frequency Provider Last Rate Last Admin    morphine injection 1 mg  1 mg IntraVENous Q4H PRN CANDELARIA Gee - CNP   1 mg at 22 1048    ipratropium-albuterol (DUONEB) nebulizer solution 1 ampule  1 ampule Inhalation Q4H PRN Darus Sandifer, MD        lactated ringers infusion   IntraVENous Continuous Bebe Jain  mL/hr at 22 1310 New Bag at 22 1310    sodium chloride flush 0.9 % injection 5-40 mL  5-40 mL IntraVENous 2 times per day Stephanie Reyes MD   10 mL at 22 1005    sodium chloride flush 0.9 % injection 5-40 mL  5-40 mL IntraVENous PRN Stephanie Reyes MD   10 mL at 22 0246    0.9 % sodium chloride infusion  25 mL IntraVENous PRN Stephanie Reyes MD        ondansetron (ZOFRAN-ODT) disintegrating tablet 4 mg  4 mg Oral Q8H PRN Stephanie Reyes MD        Or    ondansetron [facility] injection 4 mg  4 mg IntraVENous Q6H PRN Li Baldwin MD   4 mg at 03/25/22 0912    acetaminophen (TYLENOL) tablet 650 mg  650 mg Oral Q6H PRN Li Baldwin MD        Or   [place] acetaminophen (TYLENOL) suppository 650 mg  650 mg Rectal Q6H PRN Li Baldwin MD        pantoprazole (PROTONIX) injection 40 mg  40 mg IntraVENous Daily Li Baldwin MD   40 mg at 03/25/22 0912    nicotine (NICODERM CQ) 21 MG/24HR 1 patch  1 patch TransDERmal Daily Li Baldwin MD   1 patch at 03/25/22 0927    albuterol sulfate  (90 Base) MCG/ACT inhaler 2 puff  2 puff Inhalation Q6H PRN Li Baldwin MD           Allergies:  No Known Allergies    Problem List:    Patient Active Problem List   Diagnosis Code    GI bleed K92.2       Past Medical History:  No past medical history on file. Past Surgical History:        Procedure Laterality Date    DILATION AND CURETTAGE OF UTERUS      partial    TONSILLECTOMY      TUBAL LIGATION         Social History:    Social History     Tobacco Use    Smoking status: Former Smoker    Smokeless tobacco: Current User   Substance Use Topics    Alcohol use: No                                Ready to quit: Not Answered  Counseling given: Not Answered      Vital Signs (Current): There were no vitals filed for this visit.                                            BP Readings from Last 3 Encounters:   03/25/22 (!) 169/88   11/14/17 118/78   10/20/17 138/76       NPO Status:                                                                                 BMI:   Wt Readings from Last 3 Encounters:   03/25/22 185 lb 12.8 oz (84.3 kg)   11/14/17 244 lb (110.7 kg)   10/20/17 246 lb (111.6 kg)     There is no height or weight on file to calculate BMI.    CBC:   Lab Results   Component Value Date    WBC 13.4 03/23/2022    RBC 5.02 03/23/2022    HGB 13.8 03/24/2022    HCT 42.5 03/24/2022    MCV 82.1 03/23/2022    RDW 12.5 03/23/2022     03/23/2022 CMP:   Lab Results   Component Value Date     03/24/2022    K 3.7 03/24/2022    CL 97 03/24/2022    CO2 27 03/24/2022    BUN 6 03/24/2022    CREATININE 0.3 03/24/2022    GFRAA >60 03/24/2022    LABGLOM >60 03/24/2022    GLUCOSE 116 03/24/2022    PROT 6.6 03/23/2022    CALCIUM 9.3 03/24/2022    BILITOT 0.4 03/23/2022    ALKPHOS 92 03/23/2022    AST 21 03/23/2022    ALT 15 03/23/2022       POC Tests: No results for input(s): POCGLU, POCNA, POCK, POCCL, POCBUN, POCHEMO, POCHCT in the last 72 hours. Coags: No results found for: PROTIME, INR, APTT    HCG (If Applicable): No results found for: PREGTESTUR, PREGSERUM, HCG, HCGQUANT     ABGs: No results found for: PHART, PO2ART, MHT3CXM, FGX2PNG, BEART, M6EOOEOT     Type & Screen (If Applicable):  No results found for: LABABO, LABRH    Drug/Infectious Status (If Applicable):  No results found for: HIV, HEPCAB    COVID-19 Screening (If Applicable): No results found for: COVID19        Anesthesia Evaluation  Patient summary reviewed  Airway: Mallampati: II  TM distance: <3 FB   Neck ROM: full  Mouth opening: > = 3 FB Dental:    (+) edentulous      Pulmonary:   (+) COPD:  current smoker                           Cardiovascular:                      Neuro/Psych:               GI/Hepatic/Renal:   (+) bowel prep,           Endo/Other:    (+) hyperthyroidism::., electrolyte abnormalities, malignancy/cancer. ROS comment: Multinodular glcicd-XMN-cqczyy nodule Abdominal:             Vascular: Other Findings:               Anesthesia Plan      MAC     ASA 3       Induction: intravenous. Anesthetic plan and risks discussed with patient.       Plan discussed with attending and surgical team.                  Merline Chance, APRN - CRNA   3/25/2022

## 2022-03-25 NOTE — PROGRESS NOTES
Patient reporting severe abdominal pain, states it hurts to move, reports PRN tramadol and morphine ineffective, and vomited large amount of clear emesis after drinking first bottle of mag citrate per bowel prep order. Physician notified. Will continue to monitor and provide support.

## 2022-03-25 NOTE — PLAN OF CARE
Problem: Pain:  Goal: Pain level will decrease  Description: Pain level will decrease  Outcome: Ongoing  Goal: Control of acute pain  Description: Control of acute pain  Outcome: Ongoing  Goal: Control of chronic pain  Description: Control of chronic pain  Outcome: Ongoing     Problem: Nutrition  Goal: Optimal nutrition therapy  3/24/2022 2223 by Yifan Ortiz RN  Outcome: Ongoing  3/24/2022 1504 by Christopher Nguyễn RD, LD  Outcome: Ongoing     Problem: Falls - Risk of:  Goal: Will remain free from falls  Description: Will remain free from falls  Outcome: Ongoing  Goal: Absence of physical injury  Description: Absence of physical injury  Outcome: Ongoing

## 2022-03-25 NOTE — PROGRESS NOTES
Patient is awake, alert and oriented. Preop questions reviewed and verified. H&P and consent completed. Report received from [facility] ([facility]).

## 2022-03-25 NOTE — ANESTHESIA POSTPROCEDURE EVALUATION
Department of Anesthesiology  Postprocedure Note    Patient: Miguel Vergara  MRN: [id number]  YOB: 1955  Date of evaluation: 3/25/2022  Time:  2:24 PM     Procedure Summary     Date: 03/25/22 Room / Location: [address] [place] [address]    Anesthesia Start: 5718 Anesthesia Stop: 9216    Procedure: COLONOSCOPY DIAGNOSTIC (N/A ) Diagnosis: (GI Bleed)    Surgeons: Jered Greene MD Responsible Provider: Ilda Obrien MD    Anesthesia Type: MAC ASA Status: 3          Anesthesia Type: MAC    Rosa Phase I:      Rosa Phase II:      Last vitals: Reviewed and per EMR flowsheets.        Anesthesia Post Evaluation    Patient location during evaluation: bedside  Patient participation: complete - patient participated  Level of consciousness: awake and alert  Pain score: 0  Airway patency: patent  Nausea & Vomiting: no vomiting and no nausea  Complications: no  Cardiovascular status: hemodynamically stable  Respiratory status: room air  Hydration status: stable

## 2022-03-25 NOTE — PROGRESS NOTES
Hospitalist Progress Note      Name:  Gerald Verma /Age/Sex: 1955  (73 y.o. female)   MRN & CSN:  [id number] & [id number] Admission Date/Time: 3/23/2022  5:40 PM   [facility]  [facility] PCP: Bryson Akers 112 Day: 3    Assessment and Plan:       GI bleed  -Patient had  melanotic stool  -Hemoglobin 13.8  -Monitor H&H  -Type and screen ordered. -GI consult. Plan for colonoscopy today       #. Suspected colon cancer  -CT abdomen/pelvis-metastatic disease likely related to suspected colon cancer involving the mid to distal sigmoid colon with adjacent spread into the left pelvis versus adenopathy.  -Diffuse liver metastasis and diffuse peritoneal and omental and mesenteric metastasis. -Patient made aware of CT findings.  -Consult oncology  Likely to have sigmoid colon cancer with metastasis  Follow-up CEA level  If The pathology confirmed adenocarcinoma, will request all RAMON study, BRAF, DTE0zmb and MSI study  -     #. Hypokalemia and hypomagnesemia  -Patient received 20 mEq p.o., 10 mEq IV potassium, 2 g IV magnesium in ED.  -Monitor with repeat BMP.       #. COPD  - has chest wheezing   -Patient does not use any inhalers, not on oxygen at home.  -Albuterol HFA as needed. Started on breathing Rx  Will give one dose of iv solumedrol 40 mg        #. Leukocytosis  -UA not suggestive of infection  -Chest x-ray not done. -CT abdomen/pelvis-no acute abnormality of the lower chest noted. -Monitor with repeat CBC.     #.  Multinodular [id number]-RHH-cvcwps nodule  -As per the documentation-2017 patient's TSH was 0.0006 with hyperthyroidism. Patient was referred to endocrinology. Patient could not recall this.  -Check TSH and free T4      #. Chronic tobacco dependence  -Admits to smoking 1.5 PPD. Willing to quit.  -Nicotine patch ordered.     #.  Obesity with BMI 31.62. Diet ADULT DIET;  Clear Liquid  ADULT ORAL NUTRITION SUPPLEMENT; Breakfast, Dinner; Clear Liquid Oral Supplement   DVT Prophylaxis [] Lovenox, []  Heparin, [] SCDs, [] Ambulation   GI Prophylaxis [x] PPI,  [] H2 Blocker,  [] Carafate,  [] Diet/Tube Feeds   Code Status Full Code   Disposition Patient requires continued admission due to    MDM [] Low, [] Moderate,[]  High  Patient's risk as above due to      History of Present Illness: The patient was seen and examined at the bedside  Patient has vomiting this morning  Still has abdominal pain  Scheduled for colonoscopy today  No further rectal bleeding    Objective:   No intake or output data in the 24 hours ending 03/25/22 0856   Vitals:   Vitals:    03/25/22 0332   BP: (!) 143/76   Pulse: 84   Resp: 16   Temp:    SpO2: (!) 88%     Physical Exam:   GEN Awake.  Alert , not in respiratory distress, not in pain  HEENT: PEERLA, , supple neck,   Chest: air entry equal bilaterally, has chest  wheezing , no crepitation  Heart: S1 and S2 heard, no murmur, no gallop or rub, regular rate  Abdomen: soft, ND , tenderness of abdomen , +BS  Extremities: no cyanosis, tenderness or erythema, peripheral pulses audible  Neurology: alert, oriented x3, able to move 4 limbs    Medications:   Medications:    sodium chloride flush  5-40 mL IntraVENous 2 times per day    pantoprazole  40 mg IntraVENous Daily    nicotine  1 patch TransDERmal Daily      Infusions:    sodium chloride       PRN Meds: sodium chloride flush, 5-40 mL, PRN  sodium chloride, 25 mL, PRN  ondansetron, 4 mg, Q8H PRN   Or  ondansetron, 4 mg, Q6H PRN  acetaminophen, 650 mg, Q6H PRN   Or  acetaminophen, 650 mg, Q6H PRN  albuterol sulfate HFA, 2 puff, Q6H PRN          Electronically signed by Brigido Oconnor MD on 3/25/2022 at 8:56 AM

## 2022-03-25 NOTE — BRIEF OP NOTE
Brief Postoperative Note      Patient: Rebecca Addison  YOB: 1955  MRN: [id number]    Date of Procedure: 3/25/2022    Pre-Op Diagnosis: GI Bleed    Post-Op Diagnosis: Diverticulosis, sigmoid polyp, prep quite poor, unable to see about 50%^ of colon till mid transverse colon. Procedure(s):  COLONOSCOPY DIAGNOSTIC    Surgeon(s):  Fátima Bazan MD    Assistant:  * No surgical staff found *    Anesthesia: Monitor Anesthesia Care    Estimated Blood Loss (mL): Minimal    Complications: None    Specimens:   * No specimens in log *    Implants:  * No implants in log *      Drains: * No LDAs found *    Findings: As above, will need to be reprepped and done again tomorrow.      Electronically signed by Fátima Bazan MD on 3/25/2022 at 2:11 PM

## 2022-03-25 NOTE — PROGRESS NOTES
[facility]) Physicians - General Surgery    Patient ID:  Name:Sylvia Bowles  Date: 3/25/2022 10:01 AM   MRN#: [id number] VUF:0/33/3512   Admission Date:3/23/2022 Age/Sex:66 y.o. female        Chief Complaint   Patient presents with    Abdominal Pain     x6 wks    Melena     x6wks     History Obtained From:  patient, electronic medical record    HISTORY OF PRESENT ILLNESS:    Melania Mcgrath is a 73 y.o. female presenting with abdominal pain. States noticed loose stools, melena and poor appetite about 6 weeks ago however the abdominal pain worsened and prompted her to go to the ER. Last BM was this morning however states it is very loose and \"not normal\"    Location: diffuse, greater in lower abdomen  Quality, Severity: moderate  · Pain is described as aching and cramping  Timing, Duration: 6 weeks  Associated Signs & Symptoms: diarrhea, 75lb wt loss over the last 2 years, melena, nausea, poor appetitie    Workup Includes: CT abdomen/pelvis  Of Note: diffuse liver metastasis and diffuse peritoneal, omental and mesenteric metastasis as well as upper retroperitoneal metastatic adenopathy; likely related to suspected colon carcinoma involving mid to distal sigmoid colon with adjacent spread into left pelvis vs adenopathy. Small amt of fluid in pelvis. Past Medical History:    History reviewed. No pertinent past medical history.     Past Surgical History:    Past Surgical History:   Procedure Laterality Date    DILATION AND CURETTAGE OF UTERUS      partial    TONSILLECTOMY      TUBAL LIGATION         Current Medications:   Current Facility-Administered Medications   Medication Dose Route Frequency Provider Last Rate Last Admin    ipratropium-albuterol (DUONEB) nebulizer solution 1 ampule  1 ampule Inhalation Q4H ARVIND Mike MD        methylPREDNISolone sodium (SOLU-MEDROL) injection 40 mg  40 mg IntraVENous Once Bao Mike MD        morphine injection 1 mg  1 mg IntraVENous Q4H PRN Vinnie Parikh, APRN - CNP        sodium chloride flush 0.9 % injection 5-40 mL  5-40 mL IntraVENous 2 times per day Garcia Mcknight MD   10 mL at 03/24/22 2150    sodium chloride flush 0.9 % injection 5-40 mL  5-40 mL IntraVENous PRN Garcia Mcknight MD   10 mL at 03/24/22 0246    0.9 % sodium chloride infusion  25 mL IntraVENous PRN Garcia Mcknight MD        ondansetron (ZOFRAN-ODT) disintegrating tablet 4 mg  4 mg Oral Q8H PRN Garcia Mcknight MD        Or    ondansetron [facility]) injection 4 mg  4 mg IntraVENous Q6H PRN Garcia Mcknight MD   4 mg at 03/25/22 0912    acetaminophen (TYLENOL) tablet 650 mg  650 mg Oral Q6H PRN Garcia Mcknight MD        Or   Carlitos Salcristy acetaminophen (TYLENOL) suppository 650 mg  650 mg Rectal Q6H PRN Garcia Mcknight MD        pantoprazole (PROTONIX) injection 40 mg  40 mg IntraVENous Daily Garcia Mcknight MD   40 mg at 03/25/22 0912    nicotine (NICODERM CQ) 21 MG/24HR 1 patch  1 patch TransDERmal Daily Garcia Mcknight MD   1 patch at 03/24/22 0921    albuterol sulfate  (90 Base) MCG/ACT inhaler 2 puff  2 puff Inhalation Q6H PRN Garcia Mcknight MD           Allergies:  Patient has no known allergies.     Social History:   Social History     Socioeconomic History    Marital status:      Spouse name: None    Number of children: None    Years of education: None    Highest education level: None   Occupational History    None   Tobacco Use    Smoking status: Former Smoker    Smokeless tobacco: Current User   Substance and Sexual Activity    Alcohol use: No    Drug use: No    Sexual activity: None   Other Topics Concern    None   Social History Narrative    None     Social Determinants of Health     Financial Resource Strain:     Difficulty of Paying Living Expenses: Not on file   Food Insecurity:     Worried About Running Out of Food in the Last Year: Not on file    Kristy of Food in the Last Year: Not on file Transportation Needs:     Lack of Transportation (Medical): Not on file    Lack of Transportation (Non-Medical): Not on file   Physical Activity:     Days of Exercise per Week: Not on file    Minutes of Exercise per Session: Not on file   Stress:     Feeling of Stress : Not on file   Social Connections:     Frequency of Communication with Friends and Family: Not on file    Frequency of Social Gatherings with Friends and Family: Not on file    Attends [employer] Services: Not on file    Active Member of [address] [place] [address] or Organizations: Not on file    Attends Club or Organization Meetings: Not on file    Marital Status: Not on file   Intimate Partner Violence:     Fear of Current or Ex-Partner: Not on file    Emotionally Abused: Not on file    Physically Abused: Not on file    Sexually Abused: Not on file   Housing Stability:     Unable to Pay for Housing in the Last Year: Not on file    Number of [place] in the Last Year: Not on file    Unstable Housing in the Last Year: Not on file       Family History:   History reviewed. No pertinent family history. REVIEW OF SYSTEMS:    Pertinent items noted in HPI    PHYSICAL EXAM:    Physical Exam  Constitutional:       General: She is not in acute distress. Appearance: She is well-developed. Eyes:      General: No scleral icterus. Conjunctiva/sclera: Conjunctivae normal.   Cardiovascular:      Rate and Rhythm: Normal rate and regular rhythm. Heart sounds: Normal heart sounds. No murmur heard. Pulmonary:      Effort: Pulmonary effort is normal. No respiratory distress. Breath sounds: Normal breath sounds. No wheezing. Abdominal:      Palpations: Abdomen is soft. Tenderness: There is abdominal tenderness. Musculoskeletal:         General: Normal range of motion. Skin:     General: Skin is warm. Neurological:      General: No focal deficit present. Mental Status: She is alert.    Psychiatric:         Mood and Affect: Mood normal.          DATA:    Lab Results   Component Value Date    WBC 13.4 (H) 03/23/2022    HGB 13.8 03/24/2022    HCT 42.5 03/24/2022     03/23/2022     03/24/2022    K 3.7 03/24/2022    CL 97 (L) 03/24/2022    CO2 27 03/24/2022    BUN 6 03/24/2022    CREATININE 0.3 (L) 03/24/2022    GLUCOSE 116 (H) 03/24/2022    CALCIUM 9.3 03/24/2022    PROT 6.6 03/23/2022    BILITOT 0.4 03/23/2022    AST 21 03/23/2022    ALT 15 03/23/2022    ALKPHOS 92 03/23/2022       Imaging:   Pertinent laboratory and imaging studies were personally reviewed if available. IMPRESSION:    Jeyson Snell is a 73 y.o. female with abdominal pain, N/V, unintentional weight loss (75lbs), melena. CT showing diffuse liver metastasis with  carcinomatosis with likely sigmoid colon carcinoma    Patient Active Problem List    Diagnosis Date Noted    GI bleed 03/24/2022     Visit Diagnoses:  1. Lower abdominal pain    2. Malignant neoplasm of sigmoid colon ([address]    3. Abdominal carcinomatosis ([address]    4. Gastrointestinal hemorrhage with melena      PLAN:  · Discussed options and plan with Jeyson Snell  · CT revealing liver mets with carcinomatosis and likely sigmoid colon carcinoma. Does not appear to be obstruction. · GI following - colonoscopy planned today   · Oncology consulted - will order CEA level, if pathology reveals colonic adenocarinoma will request all RAMON study, BRAF, [id number] and MSI study. Also will consider PET/CT scan as OP. · Will await pathology and recommendation regarding possible chemotherapy.   If mediport anticipated, will try to place ASAP  · Thank you for the consultation and the opportunity to care for Jeyson Snell    Electronically signed by CANEDLARIA Dowd CNP, 3/25/2022, 10:01 AM

## 2022-03-26 NOTE — PROGRESS NOTES
Report called to sheree leal. Pt awake and responsive. VSS. Denies c/o or needs. May have regular diet and is cleared from gi standpoint.

## 2022-03-26 NOTE — PLAN OF CARE
Problem: Pain:  Goal: Pain level will decrease  Description: Pain level will decrease  3/26/2022 0955 by Jameson Narvaez RN  Outcome: Completed  3/25/2022 2305 by Yifan Ortiz RN  Outcome: Ongoing  Goal: Control of acute pain  Description: Control of acute pain  3/26/2022 0955 by Jameson Narvaez RN  Outcome: Completed  3/25/2022 2305 by Yifan Ortiz RN  Outcome: Ongoing  Goal: Control of chronic pain  Description: Control of chronic pain  3/26/2022 0955 by Jameson Narvaez RN  Outcome: Completed  3/25/2022 2305 by Yifan Ortiz RN  Outcome: Ongoing     Problem: Falls - Risk of:  Goal: Will remain free from falls  Description: Will remain free from falls  3/26/2022 0955 by Jameson Narvaez RN  Outcome: Completed  3/25/2022 2305 by Yifan Ortiz RN  Outcome: Ongoing  Goal: Absence of physical injury  Description: Absence of physical injury  3/26/2022 0955 by Jameson Narvaez RN  Outcome: Completed  3/25/2022 2305 by Yifan Ortiz RN  Outcome: Ongoing     Problem: Physical Regulation:  Goal: Ability to maintain clinical measurements within normal limits will improve  Description: Ability to maintain clinical measurements within normal limits will improve  Outcome: Completed  Goal: Will show no signs and symptoms of electrolyte imbalance  Description: Will show no signs and symptoms of electrolyte imbalance  Outcome: Completed     Problem: Fluid Volume:  Goal: Ability to achieve a balanced intake and output will improve  Description: Ability to achieve a balanced intake and output will improve  Outcome: Completed

## 2022-03-26 NOTE — PROGRESS NOTES
Hospitalist Progress Note      Name:  Mulugeta Ann /Age/Sex: 1955  (73 y.o. female)   MRN & CSN:  [id number] & [id number] Admission Date/Time: 3/23/2022  5:40 PM   [facility]: Tracy Stewart [address] [facility] Day: 4    Assessment and Plan:       GI bleed  -Patient had  melanotic stool  -Hemoglobin 13.8  -Monitor H&H  -Type and screen ordered. -GI consult. S/p  colonoscopy today  Follow GI recommedation  On iv PPI     #. Suspected colon cancer versus liver cancer   -CT abdomen/pelvis-metastatic disease likely related to suspected colon cancer involving the mid to distal sigmoid colon with adjacent spread into the left pelvis versus adenopathy.  -Diffuse liver metastasis and diffuse peritoneal and omental and mesenteric metastasis. -Patient made aware of CT findings.  -Consult oncology  Likely to have sigmoid colon cancer with metastasis  Follow-up CEA level  If The pathology confirmed adenocarcinoma, will request all RAMON study, BRAF, [id number] and MSI study  -     #. Hypokalemia and hypomagnesemia  -Patient received 20 mEq p.o., 10 mEq IV potassium, 2 g IV magnesium in ED.  -Monitor with repeat BMP.       #. COPD  - has chest wheezing   -Patient does not use any inhalers, not on oxygen at home.  -Albuterol HFA as needed. Started on breathing Rx  S/p  iv solumedrol 40 mg   Continue on oral steroid   On 3 L oxygen today  D/c iv fluid as the patient on regular diet  Order CXR         #.  Leukocytosis  -UA not suggestive of infection  -Chest x-ray ordered   -CT abdomen/pelvis-no acute abnormality of the lower chest noted. -Monitor with repeat CBC. Today lab still pending      #.  Multinodular yweryt-ORF-gsxach nodule  -As per the documentation-2017 patient's TSH was 0.0006 with hyperthyroidism. Patient was referred to endocrinology. Patient could not recall this. - free T4 high  TSh still pending   Consult endocrinologist      #.   Chronic tobacco dependence  -Admits to smoking 1.5 PPD. Willing to quit.  -Nicotine patch ordered.     #.  Obesity with BMI 31.62. Diet ADULT DIET; Regular  ADULT ORAL NUTRITION SUPPLEMENT; Breakfast, AM Snack, Lunch, PM Snack, Dinner, HS Snack; Standard High Calorie/High Protein Oral Supplement   DVT Prophylaxis [] Lovenox, []  Heparin, [] SCDs, [] Ambulation   GI Prophylaxis [x] PPI,  [] H2 Blocker,  [] Carafate,  [] Diet/Tube Feeds   Code Status Full Code   Disposition Patient requires continued admission due to    MDM [] Low, [] Moderate,[]  High  Patient's risk as above due to      History of Present Illness: The patient was seen and examined at the bedside  Patient states abdominal pain is under control  No vomiting  Underwent colonoscopy today  Still has abdominal pain      Objective: Intake/Output Summary (Last 24 hours) at 3/26/2022 0949  Last data filed at 3/26/2022 0905  Gross per 24 hour   Intake 700 ml   Output --   Net 700 ml      Vitals:   Vitals:    03/26/22 0927   BP:    Pulse:    Resp:    Temp:    SpO2: 92%     Physical Exam:   GEN Awake.  Alert , not in respiratory distress, not in pain  HEENT: PEERLA, , supple neck,   Chest: air entry equal bilaterally, has chest  wheezing , no crepitation  Heart: S1 and S2 heard, no murmur, no gallop or rub, regular rate  Abdomen: soft, ND , tenderness of abdomen , +BS  Extremities: no cyanosis, tenderness or erythema, peripheral pulses audible  Neurology: alert, oriented x3, able to move 4 limbs    Medications:   Medications:    sodium chloride flush  5-40 mL IntraVENous 2 times per day    pantoprazole  40 mg IntraVENous Daily    nicotine  1 patch TransDERmal Daily      Infusions:    lactated ringers 100 mL/hr at 03/26/22 0819    sodium chloride       PRN Meds: ipratropium-albuterol, 1 ampule, Q4H PRN  traMADol, 50 mg, Q6H PRN  morphine, 2 mg, Q6H PRN  sodium chloride flush, 5-40 mL, PRN  sodium chloride, 25 mL, PRN  ondansetron, 4 mg, Q8H PRN   Or  ondansetron, 4 mg, Q6H PRN  acetaminophen, 650 mg, Q6H PRN   Or  acetaminophen, 650 mg, Q6H PRN  albuterol sulfate HFA, 2 puff, Q6H PRN          Electronically signed by Esha Elaine MD on 3/26/2022 at 9:49 AM

## 2022-03-26 NOTE — BRIEF OP NOTE
Brief Postoperative Note      Patient: Natalio King  YOB: 1955  MRN: [id number]    Date of Procedure: 3/26/2022    Pre-Op Diagnosis: GI Bleed    Post-Op Diagnosis: Diverticulosis, internal hemorrhoids, sigmoid polyp removed with cold biopsy forceps. Poor prep. Procedure(s):  COLONOSCOPY DIAGNOSTIC    Surgeon(s):  Idania Kang MD    Assistant:  * No surgical staff found *    Anesthesia: Monitor Anesthesia Care    Estimated Blood Loss (mL): Minimal    Complications: None    Specimens:   * No specimens in log *    Implants:  * No implants in log *      Drains: * No LDAs found *    Findings: As above, no gross lesions seen to indicate colon cancer. Small polyp could have been missed. Consider ct guided liver bx.      Electronically signed by Idania Kang MD on 3/26/2022 at 9:07 AM

## 2022-03-26 NOTE — CONSULTS
Endocrinology   Consult Note      Dear Doctor Suzy Al for the Consult     Pt. Was Admitted for  abdominal pain// GI bleeding    Reason for Consult: Possible thyroid dysfunction with elevated serum free T4      History Obtained From:  Patient/ EMR       HISTORY OF PRESENT ILLNESS: Patient is 62-year-old female history of COPD not on home oxygen obesity admitted for severe diffuse abdominal pain. Also having passing dark stool with history of weight loss. The scan showed that she has diffuse liver metastasis and diffuse peritoneal and omental and mesenteric metastasis directly related to colon cancer involving mid to distal sigmoid colon. Also noted to have elevated serum free T4. I was consulted to evaluate her thyroid function status and she is suspected to have a mass lesion  However patient had colonoscopy done yesterday no masses were found except some polyps as findings noted below          ROS:   Pt's ROS done in detail. Abnormal ROS are noted in Medical and Surgical History Section below: Other Medical History:    History reviewed. No pertinent past medical history. Surgical History:        Procedure Laterality Date    DILATION AND CURETTAGE OF UTERUS      partial    TONSILLECTOMY      TUBAL LIGATION         Allergies:  Patient has no known allergies. Family History:   History reviewed. No pertinent family history. REVIEW OF SYSTEMS:  Review of System Done as noted above     PHYSICAL EXAM:      Vitals:    /61   Pulse 105   Temp 98.3 °F (36.8 °C) (Oral)   Resp 16   Ht 5' 5\" (1.651 m)   Wt 185 lb 12.8 oz (84.3 kg)   SpO2 92%   BMI 30.92 kg/m²     CONSTITUTIONAL:  awake, alert, cooperative, appears stated age   EYES:  vision intact Fundoscopic Exam not performed   ENT:Normal  NECK:  Supple, No JVD.    Thyroid Exam:Normal slightly enlarged  LUNGS:  Has Vesicular Breath Sounds,   CARDIOVASCULAR:  Normal apical impulse, regular rate and rhythm, normal S1 and S2, no S3 or S4, and has no  murmur   ABDOMEN:  No scars, normal bowel sounds, soft, non-distended, non-tender, no masses palpated, no hepatolienomegaly  Musculoskeletal: Normal  Extremities: Normal, peripheral pulses normal, , has no edema   NEUROLOGIC:  Awake, alert, oriented to name, place and time. Cranial nerves II-XII are grossly intact. Motor is  intact. Sensory is intact. ,  and gait is normal.    DATA:    CBC:   Recent Labs     03/23/22  1637 03/24/22  0343 03/24/22  0719 03/24/22  1614 03/24/22  1954   WBC 13.4*  --   --   --   --    HGB 13.9   < > 14.1 13.4 13.8     --   --   --   --     < > = values in this interval not displayed. CMP:  Recent Labs     03/23/22  1637 03/24/22  0343   * 135   K 2.8* 3.7   CL 91* 97*   CO2 28 27   BUN 8 6   CREATININE 0.3* 0.3*   CALCIUM 9.5 9.3   PROT 6.6  --    LABALBU 3.6  --    BILITOT 0.4  --    ALKPHOS 92  --    AST 21  --    ALT 15  --      Lipids: No results found for: CHOL, HDL, TRIG  Glucose: No results for input(s): POCGLU in the last 72 hours. Hemoglobin A1C: No results found for: LABA1C  Free T4:   Lab Results   Component Value Date    T4FREE 2.54 03/25/2022     Free T3: No results found for: FT3  TSH High Sensitivity: No results found for: TSHHS    CT ABDOMEN PELVIS W IV CONTRAST Additional Contrast? None   Final Result      1. Note is made of diffuse liver metastasis and diffuse peritoneal and   omental and mesenteric metastasis as well as possibly some upper   retroperitoneal metastatic adenopathy. Though not definitive, this likely is   related to a suspected colon carcinoma involving the mid to distal sigmoid   colon with adjacent spread into the left pelvis versus adenopathy. 2.  Small amount of fluid noted within the pelvis. 3.  Somewhat distended gallbladder thought to contain gallstones, 1 of which   may be within the gallbladder neck versus mural calcification.          XR CHEST PORTABLE    (Results Pending)   US HEAD NECK SOFT TISSUE THYROID    (Results Pending)        Colonoscopy done on 3/25/2022  : Diverticulosis, internal hemorrhoids, sigmoid polyp removed with cold biopsy forceps. Poor prep. Scheduled Medicines   Medications:    predniSONE  40 mg Oral Daily    sodium chloride flush  5-40 mL IntraVENous 2 times per day    pantoprazole  40 mg IntraVENous Daily    nicotine  1 patch TransDERmal Daily      Infusions:    sodium chloride           IMPRESSION    Patient Active Problem List   Diagnosis    GI bleed    Polyp of sigmoid colon         elevated serum free T4/thyroid dysfunction        Metastatic disease extending to liver peritoneum omentum mesenteric lymph node    RECOMMENDATIONS:      1. Reviewed POC blood glucose . Labs and X ray results   2. Reviewed Home and Current Medicines   3. Will order thyrois studies being serum free T4, free serum free T3, TSH antithyroid antibodies and thyrotropin receptor antibody  4. We will also do ultrasound thyroid gland       Will follow with you  Again thank you for sharing pt's care with me.      Truly yours,       Elizabeth Moss MD

## 2022-03-26 NOTE — ANESTHESIA POSTPROCEDURE EVALUATION
Department of Anesthesiology  Postprocedure Note    Patient: Heike Rojo  MRN: [id number]  YOB: 1955  Date of evaluation: 3/26/2022  Time:  9:05 AM     Procedure Summary     Date: 03/26/22 Room / Location: [address] [place] [address]    Anesthesia Start: 2170 Anesthesia Stop: 9554    Procedure: COLONOSCOPY POLYPECTOMY SNARE/COLD BIOPSY (N/A ) Diagnosis: (GI Bleed)    Surgeons: Cathy Pineda MD Responsible Provider: Jh Sarabia MD    Anesthesia Type: MAC ASA Status: 3          Anesthesia Type: MAC    Rosa Phase I:  10    Rosa Phase II:  10    Last vitals: Reviewed and per EMR flowsheets.        Anesthesia Post Evaluation    Patient location during evaluation: bedside  Patient participation: complete - patient participated  Level of consciousness: awake and alert  Pain score: 0  Airway patency: patent  Nausea & Vomiting: no nausea and no vomiting  Complications: no  Cardiovascular status: hemodynamically stable  Respiratory status: acceptable, room air, spontaneous ventilation and nonlabored ventilation  Hydration status: euvolemic

## 2022-03-26 NOTE — ANESTHESIA PRE PROCEDURE
Department of Anesthesiology  Preprocedure Note       Name:  Chip Gallegos   Age:  73 y.o.  :  1955                                          MRN:  [id number]         Date:  3/26/2022      Surgeon: Melissa Bradford):  Faina Gonzalez MD    Procedure: Procedure(s):  COLONOSCOPY DIAGNOSTIC    Medications prior to admission:   Prior to Admission medications    Medication Sig Start Date End Date Taking?  Authorizing Provider   VENTOLIN  (90 Base) MCG/ACT inhaler INHALE 2 PUFFS PO Q 4 H PRN FOR WHEEZING  Patient not taking: Reported on 3/24/2022 9/8/17   Historical Provider, MD   metFORMIN (GLUCOPHAGE) 500 MG tablet TK 1 T PO QD WC  Patient not taking: Reported on 3/24/2022 9/21/17   Historical Provider, MD   INCRUSE ELLIPTA 62.5 MCG/INH AEPB INL 1 PUFF PO QD  Patient not taking: Reported on 3/24/2022 10/12/17   Historical Provider, MD       Current medications:    Current Facility-Administered Medications   Medication Dose Route Frequency Provider Last Rate Last Admin    ipratropium-albuterol (DUONEB) nebulizer solution 1 ampule  1 ampule Inhalation Q4H PRN Padilla Rivera MD        lactated ringers infusion   IntraVENous Continuous Radha MD Barrera 100 mL/hr at 22 1604 100 mL at 22 1604    traMADol (ULTRAM) tablet 50 mg  50 mg Oral Q6H PRN Padilla Rivera MD   50 mg at 22 1614    morphine (PF) injection 2 mg  2 mg IntraVENous Q6H PRN Padilla Rivera MD   2 mg at 22 0620    sodium chloride flush 0.9 % injection 5-40 mL  5-40 mL IntraVENous 2 times per day Miguel Schwarz MD   10 mL at 22 1005    sodium chloride flush 0.9 % injection 5-40 mL  5-40 mL IntraVENous PRN Miguel Schwarz MD   10 mL at 22 0246    0.9 % sodium chloride infusion  25 mL IntraVENous PRN Miguel Schwarz MD        ondansetron (ZOFRAN-ODT) disintegrating tablet 4 mg  4 mg Oral Q8H PRN Miguel Schwarz MD        Or    ondansetron (ZOFRAN) injection 4 mg  4 mg IntraVENous Q6H PRN Vance Yadav MD   4 mg at 03/26/22 3982    acetaminophen (TYLENOL) tablet 650 mg  650 mg Oral Q6H PRN Vance Yadav MD        Or   Hawk acetaminophen (TYLENOL) suppository 650 mg  650 mg Rectal Q6H PRN Vance Yadav MD        pantoprazole (PROTONIX) injection 40 mg  40 mg IntraVENous Daily Vance Yadav MD   40 mg at 03/25/22 0912    nicotine (NICODERM CQ) 21 MG/24HR 1 patch  1 patch TransDERmal Daily Vance Yadav MD   1 patch at 03/25/22 0927    albuterol sulfate  (90 Base) MCG/ACT inhaler 2 puff  2 puff Inhalation Q6H PRN Vance Yadav MD           Allergies:  No Known Allergies    Problem List:    Patient Active Problem List   Diagnosis Code    GI bleed K92.2       Past Medical History:  History reviewed. No pertinent past medical history. Past Surgical History:        Procedure Laterality Date    DILATION AND CURETTAGE OF UTERUS      partial    TONSILLECTOMY      TUBAL LIGATION         Social History:    Social History     Tobacco Use    Smoking status: Former Smoker    Smokeless tobacco: Current User   Substance Use Topics    Alcohol use:  No                                Ready to quit: Not Answered  Counseling given: Not Answered      Vital Signs (Current):   Vitals:    03/25/22 1301 03/25/22 1449 03/25/22 2115 03/26/22 0159   BP: (!) 169/88 105/85 130/74 122/63   Pulse: 99 95 114 99   Resp: 18 22 18 19   Temp: 36.8 °C (98.2 °F) 36.3 °C (97.3 °F) 36.6 °C (97.9 °F) 36.8 °C (98.2 °F)   TempSrc: Temporal Oral Oral Oral   SpO2: 93% (!) 85%  93%   Weight:       Height:                                                  BP Readings from Last 3 Encounters:   03/26/22 122/63   03/25/22 (!) 179/81   11/14/17 118/78       NPO Status: Time of last liquid consumption: 0800                        Time of last solid consumption: 0000                        Date of last liquid consumption: 03/25/22                        Date of last solid food consumption: 03/23/22    BMI:   Wt Readings from Last 3 Encounters:   03/25/22 185 lb 12.8 oz (84.3 kg)   11/14/17 244 lb (110.7 kg)   10/20/17 246 lb (111.6 kg)     Body mass index is 30.92 kg/m². CBC:   Lab Results   Component Value Date    WBC 13.4 03/23/2022    RBC 5.02 03/23/2022    HGB 13.8 03/24/2022    HCT 42.5 03/24/2022    MCV 82.1 03/23/2022    RDW 12.5 03/23/2022     03/23/2022       CMP:   Lab Results   Component Value Date     03/24/2022    K 3.7 03/24/2022    CL 97 03/24/2022    CO2 27 03/24/2022    BUN 6 03/24/2022    CREATININE 0.3 03/24/2022    GFRAA >60 03/24/2022    LABGLOM >60 03/24/2022    GLUCOSE 116 03/24/2022    PROT 6.6 03/23/2022    CALCIUM 9.3 03/24/2022    BILITOT 0.4 03/23/2022    ALKPHOS 92 03/23/2022    AST 21 03/23/2022    ALT 15 03/23/2022       POC Tests: No results for input(s): POCGLU, POCNA, POCK, POCCL, POCBUN, POCHEMO, POCHCT in the last 72 hours. Coags: No results found for: PROTIME, INR, APTT    HCG (If Applicable): No results found for: PREGTESTUR, PREGSERUM, HCG, HCGQUANT     ABGs: No results found for: PHART, PO2ART, KXI5THK, SIX0QCW, BEART, B0AOQEIV     Type & Screen (If Applicable):  No results found for: LABABO, LABRH    Drug/Infectious Status (If Applicable):  No results found for: HIV, HEPCAB    COVID-19 Screening (If Applicable): No results found for: COVID19        Anesthesia Evaluation  Patient summary reviewed no history of anesthetic complications:   Airway: Mallampati: II  TM distance: >3 FB   Neck ROM: full  Mouth opening: > = 3 FB Dental:    (+) edentulous      Pulmonary:normal exam                              ROS comment: Former Smoker   Cardiovascular:  Exercise tolerance: good (>4 METS),            Beta Blocker:  Not on Beta Blocker         Neuro/Psych:   Negative Neuro/Psych ROS              GI/Hepatic/Renal:   (+) bowel prep,          ROS comment: GI Bleed.    Endo/Other:    (+) DiabetesType II DM, , .                  ROS comment: Multinodular [id number]-GFG-ildsbg nodule Abdominal:             Vascular: negative vascular ROS. Other Findings:           Anesthesia Plan      MAC     ASA 3       Induction: intravenous. Anesthetic plan and risks discussed with patient. CANDELARIA Hernandez CRNA   3/26/2022        Pre Anesthesia Evaluation complete. Anesthesia plan, risks, benefits, alternatives, and personnel discussed with patient and/or legal guardian. Patient and/or legal guardian verbalized an understanding and agreed to proceed. Anesthesia plan discussed with care team members and agreed upon.   CANDELARIA Hernandez CRNA  3/26/2022

## 2022-03-27 NOTE — PROGRESS NOTES
Hospitalist Progress Note      Name:  Josee Collazo /Age/Sex: 1955  (73 y.o. female)   MRN & CSN:  [id number] & [id number] Admission Date/Time: 3/23/2022  5:40 PM   Location:  [facility]-A PCP: Bryson Montejo 112 Day: 5    Assessment and Plan:       #.  GI bleed  -Patient had  melanotic stool  -Hemoglobin 13.8  -Monitor H&H  Stable Hgb. No need supplements now. -GI consult. Colonoscopy was done, no colon CA was noted. -Follow GI recommedation     #. Suspected colon cancer versus liver cancer   -CT abdomen/pelvis-metastatic disease likely related to suspected colon cancer involving the mid to distal sigmoid colon with adjacent spread into the left pelvis versus adenopathy.  -Diffuse liver metastasis and diffuse peritoneal and omental and mesenteric metastasis. -Patient made aware of CT findings. -GI consult. Colonoscopy was done, no colon CA was noted. -Consult oncology, will follow with recommendations. -CEA level is not elevated. -Pt will need FNA of liver lesion. Pt also has multiple thyroid nodules. Might benefit from work up.     #. Hypokalemia and hypomagnesemia  -Patient received 20 mEq p.o., 10 mEq IV potassium, 2 g IV magnesium in ED.  -Will order labs, as no monitoring of electrolytes was done. #.  COPD  -Patient does not use any inhalers, not on oxygen at home.  -Albuterol HFA as needed.  -Started on breathing Rx  -S/p  iv solumedrol 40 mg   -Continue on oral steroid   -Pt's lungs are clear. On 2 L oxygen today  -Encourage IS. #.  Leukocytosis  -UA not suggestive of infection  -Chest x-ray ordered, shows Atelectasis vs PNA. -CT abdomen/pelvis-no acute abnormality of the lower chest noted. -Will order labs, as no labs were done for a couple of days.    -Pt is on steroids, so WBCs will be elevated. Will follow.     #.  Multinodular utxulq-QED-itauhl nodule  -As per the documentation-2017 patient's TSH was 0.0006 with hyperthyroidism.   Patient was referred to endocrinology. Patient could not recall this. - free T4 high  -TSH was not done.  -Will order labs. -Pt is tachycardic. Will add Metoprolol.  -endocrinologist was consulted, and can add PTU, or Methimazole.     #. Chronic tobacco dependence  -Admits to smoking 1.5 PPD. Willing to quit.  -Nicotine patch ordered.     #.  Obesity with BMI 31.62. Diet ADULT DIET; Regular  ADULT ORAL NUTRITION SUPPLEMENT; Breakfast, AM Snack, Lunch, PM Snack, Dinner, HS Snack; Standard High Calorie/High Protein Oral Supplement   DVT Prophylaxis [] Lovenox, []  Heparin, [] SCDs, [] Ambulation   GI Prophylaxis [x] PPI,  [] H2 Blocker,  [] Carafate,  [] Diet/Tube Feeds   Code Status Full Code   Disposition Patient requires continued admission due to    MDM [] Low, [] Moderate,[]  High  Patient's risk as above due to      History of Present Illness: The patient was seen in her room. She has poor appetite, but is trying to eat. Pt is ambulating well on her own. No F/C/S. She is tachycardic, but he never noticed it. Pt is aware of results from CT. Waiting on plan from Oncology. No F/C/S. No N/V. Abdomen is tender. No cough or wheeze. Objective: Intake/Output Summary (Last 24 hours) at 3/27/2022 0828  Last data filed at 3/26/2022 0905  Gross per 24 hour   Intake 400 ml   Output --   Net 400 ml      Vitals:   Vitals:    03/27/22 0800   BP: 134/74   Pulse: 101   Resp: 18   Temp: 98.1 °F (36.7 °C)   SpO2: 93%     Physical Exam:   GEN Awake. Alert , not in respiratory distress, not in pain  HEENT: PEERLA, , supple neck,   Chest: air entry equal bilaterally, CTA b/l on 2 lit NC.   Heart: S1 and S2 heard, Tachycardia, no murmur, no gallop or rub,   Abdomen: soft, ND , tenderness of abdomen , +BS  Extremities: no cyanosis, tenderness or erythema, peripheral pulses audible  Neurology: alert, oriented x3, able to move 4 limbs    Medications:   Medications:    predniSONE  40 mg Oral Daily    insulin lispro

## 2022-03-27 NOTE — PROGRESS NOTES
Progress Note( Dr. Rama Ramirez)  3/27/2022  Subjective:   Admit Date: 3/23/2022  PCP: CANDELARIA Packer CNP    Admitted For : abdominal pain// GI bleeding    Consulted For: Possible thyroid dysfunction with elevated serum free T4    Interval History: Is being investigated for possible colorectal tumor with metastasis to the liver. Patient had colonoscopy  There is no mass noted except some colonic polyps that were biopsied  The thyroid function test ordered but not available  Also had thyroid ultrasound done which is results described below    Denies any chest pains,   Denies SOB . Denies nausea or vomiting. No new bowel or bladder symptoms. No intake or output data in the 24 hours ending 03/27/22 1602    DATA    CBC: Recent Labs     03/24/22  1614 03/24/22 1954 03/27/22  1425   WBC  --   --  17.2*   HGB 13.4 13.8 12.3*   PLT  --   --  297    CMP:No results for input(s): NA, K, CL, CO2, BUN, CREATININE, GLU, CALCIUM, PROT, LABALBU, BILITOT, ALKPHOS, AST, ALT in the last 72 hours. Lipids: No results found for: CHOL, HDL, TRIG  Glucose:  Recent Labs     03/26/22 2028 03/27/22  0803 03/27/22  1125   POCGLU 189* 89 138*     [id number]:No results found for: LABA1C  High Sensitivity TSH: No results found for: TSHHS  Free T3: No results found for: FT3  Free T4:  Lab Results   Component Value Date    T4FREE 2.54 03/25/2022       US HEAD NECK SOFT TISSUE THYROID   Final Result   Multinodular goiter with bilateral 2.3 cm TI-RADS 4 nodules measuring 2.3 cm. These are moderately suspicious. FNA is recommended given size greater than   1.5 cm. XR CHEST PORTABLE   Final Result   Left lower lobe airspace opacity could represent atelectasis or pneumonia         CT ABDOMEN PELVIS W IV CONTRAST Additional Contrast? None   Final Result      1.   Note is made of diffuse liver metastasis and diffuse peritoneal and   omental and mesenteric metastasis as well as possibly some upper   retroperitoneal metastatic adenopathy. Though not definitive, this likely is   related to a suspected colon carcinoma involving the mid to distal sigmoid   colon with adjacent spread into the left pelvis versus adenopathy. 2.  Small amount of fluid noted within the pelvis. 3.  Somewhat distended gallbladder thought to contain gallstones, 1 of which   may be within the gallbladder neck versus mural calcification. Scheduled Medicines   Medications:    azelastine  1 spray Each Nostril BID    metoprolol tartrate  25 mg Oral BID    predniSONE  40 mg Oral Daily    insulin lispro  0-6 Units SubCUTAneous TID WC    insulin lispro  0-3 Units SubCUTAneous Nightly    sodium chloride flush  5-40 mL IntraVENous 2 times per day    pantoprazole  40 mg IntraVENous Daily    nicotine  1 patch TransDERmal Daily      Infusions:    sodium chloride           Objective:   Vitals: BP (!) 142/84   Pulse 92   Temp 98.2 °F (36.8 °C) (Oral)   Resp 18   Ht 5' 5\" (1.651 m)   Wt 185 lb 12.8 oz (84.3 kg)   SpO2 93%   BMI 30.92 kg/m²   General appearance: alert and cooperative with exam  Neck: no JVD or bruit  Thyroid multinodular goiter  Lungs: Has Vesicular Breath sounds   Heart:  regular rate and rhythm  Abdomen: soft, non-tender; bowel sounds normal; no masses,  no organomegaly  Musculoskeletal: Normal  Extremities: extremities normal, , no edema  Neurologic:  Awake, alert, oriented to name, place and time. Cranial nerves II-XII are grossly intact. Motor is  intact. Sensory is intact. ,  and gait is normal.    Assessment:     Patient Active Problem List:     GI bleed     Polyp of sigmoid colon     Thyroid dysfunction/hyperthyroidism/multinodular goiter of bilateral      Has liver metastasis? Plan:     1. Reviewed POC blood glucose . Labs and X ray results   2. Reviewed Current Medicines   3.  Patient had multinodular goiter bilaterally that nodules over 2 cm but needs to have thyroid biopsy done to be done at a later date  4. Thyroid function test pending  5. GI oncology are working up possible colonic cancer with metastasis  6. Will follow     .      Merle Crain MD, MD

## 2022-03-28 NOTE — PROGRESS NOTES
V2.0  [facility] Hospitalist Progress Note      Name:  Marija Cohen /Age/Sex: 1955  (73 y.o. female)   MRN & CSN:  [id number] & [id number] Encounter Date/Time: 3/28/2022 9:31 PM EDT    Location:  [facility]-A PCP: Russell Dick, [address] [facility] Day: 6    Assessment and Plan:   Marija Cohen is a 73 y.o. female who presents with GI bleed    GI bleed, stable, resolved. -Patient complaining of melanotic stools on admission  -Hemoglobin stable  -Monitor H&H  -Type and screen ordered. -GI & GS following.     Suspected primary colon cancer with metastasis  -CT abdomen/pelvis-metastatic disease likely related to suspected colon cancer involving the mid to distal sigmoid colon with adjacent spread into the left pelvis versus adenopathy. Diffuse liver metastasis and diffuse peritoneal and omental and mesenteric metastasis. -Patient made aware of CT findings.  -Consult general surgery & Heme Onc  -Offered IV opioid medication for abdominal pain. Patient declined. - Colonoscopy revealed no lesions for biopsy. CEA elevated @29.9    - IR liver biopsy pending results. - Patient may require mediport.  -Palliative care consult to help patient decide care goals and symptom management.     SIRS  -Leukocytosis improving, WBC 14.8 today. -UA not suggestive of infection  -Chest x-ray not done. -CT abdomen/pelvis-no acute abnormality of the lower chest noted. -Monitor with repeat CBC. Hypokalemia resolved, Hypomagnesemia repleated  - monitor and replete PRN     Multinodular lnrovm-OTD-lovmdq nodule  -As per the documentation-2017 patient's TSH was 0.0006 with hyperthyroidism. -TSH < 0.010, fT4 1.73, fT3 2.4   - Ti-RADS 4 thyroid nodules, differ timing of FNA to Heme/onc & Endocrinology at this time. endocrinology & heme onc following     COPD not in exacerbation  -Not on home oxygen  -Does not use inhalers at home.   - Patient had wheezing early in admission and required 3L NC.  - Currently on 2L  - Continue Oral steroid x 5 days. Stop 3/29/22  - Continue Albuterol & PRN Nebs. - IVF stopped patient tolerating regular diet. Chronic tobacco dependence  -Admits to smoking 1.5 PPD  -Continue Nicotine patch  -Continue to encourage long term cessation     Obesity with BMI 31.62 class1  Encouraged lifestyle modification as tolerated    Diet ADULT DIET; Regular  ADULT ORAL NUTRITION SUPPLEMENT; Breakfast, Lunch, Dinner; Low Calorie/High Protein Oral Supplement   DVT Prophylaxis [] Lovenox, []  Heparin, [] SCDs, [] Ambulation,  [] Eliquis, [] Xarelto  [] Coumadin  X contraindicated in GI bleed. GI Prophylaxis [] PPI,  [] H2 Blocker,  [] Carafate,  [] Diet,  [x] No GI prophylaxis, N/A: patient is not under significant medical stress, non-ICU or is receiving a diet/tube feeds   Code Status Full Code   Disposition From: Home  Expected Disposition: Home with home health  Estimated Date of Discharge: 2-3 days  Patient requires continued admission due to pending biopsy results. Possible mediport placement. Palliative care consult. Further specialist evaluation and workup. Surrogate Decision Maker/ POA Sister Juan Bolaños   [facility] [] Low, [] Moderate,[x]  High  Patient's risk as above due to:      [] One or more chronic illnesses with mild exacerbation progression      [x] Two or more stable chronic illnesses      [x] Undiagnosed new problem with uncertain prognosis      [] Elective major surgery      [x]Prescription drug management       Subjective:   Chief Complaint:  Abdominal Pain (x6 wks) and Melena (x6wks)     Admitted for: GI bleed     Julianna Red is a 73 y.o. female who presents with melena. Patient underwent CT guided biopsy of liver lesion today without complication. Pathology pending. Patient Complaing of feeling constipated. Had a bowel movement with some improvement. Declined dilaudid for pain per nursing. Not on stool softener.   States stools were not hard and came out with out much straining. Agreeable to glycerin suppository PRN. Advised to increase hydration and consider stool softner or supossitory. Feels she needs to try again. Will re assess after bathroom. Ten point ROS reviewed negative, unless as noted above      Objective:   No intake or output data in the 24 hours ending 03/28/22 2131   Vitals:   Vitals:    03/28/22 1945   BP: 138/64   Pulse: 79   Resp: 18   Temp: 98 °F (36.7 °C)   SpO2: 95%     Physical Exam:   GEN Awake female, sitting upright in bed in no apparent distress. Appears given age. EYES Pupils are equally round. No scleral erythema, discharge, or conjunctivitis. HENT Mucous membranes are moist. Oral pharynx without exudates, no evidence of thrush. NECK Supple, no apparent thyromegaly or masses. RESP Clear to auscultation, no wheezes, rales or rhonchi. Symmetric chest movement while on room air. CARDIO/VASC S1/S2 auscultated. Regular rate without appreciable murmurs, rubs, or gallops. No JVD or carotid bruits. Peripheral pulses equal bilaterally and palpable. No peripheral edema. GI Abdomen is soft without significant tenderness, masses, or guarding. Bowel sounds are normoactive. Rectal exam deferred.  No costovertebral angle tenderness. Normal appearing external genitalia. Davis catheter is not present. HEME/LYMPH No palpable cervical lymphadenopathy and no hepatosplenomegaly. No petechiae or ecchymoses. MSK No gross joint deformities. SKIN Normal coloration, warm, dry. NEURO Cranial nerves appear grossly intact, normal speech, no lateralizing weakness. PSYCH Awake, alert, oriented x 4. Affect appropriate. Past Medical History:    History reviewed. No pertinent past medical history. PSHX:  has a past surgical history that includes Tonsillectomy; Tubal ligation; Dilation and curettage of uterus; Colonoscopy (N/A, 3/26/2022); and Colonoscopy (N/A, 3/26/2022). Allergies: No Known Allergies    FAM HX: family history is not on file.   Soc HX: Social History     Socioeconomic History    Marital status:      Spouse name: None    Number of children: None    Years of education: None    Highest education level: None   Occupational History    None   Tobacco Use    Smoking status: Former Smoker    Smokeless tobacco: Current User   Substance and Sexual Activity    Alcohol use: No    Drug use: No    Sexual activity: None   Other Topics Concern    None   Social History Narrative    None     Social Determinants of Health     Financial Resource Strain:     Difficulty of Paying Living Expenses: Not on file   Food Insecurity:     Worried About Running Out of Food in the Last Year: Not on file    Kristy of Food in the Last Year: Not on file   Transportation Needs:     Lack of Transportation (Medical): Not on file    Lack of Transportation (Non-Medical):  Not on file   Physical Activity:     Days of Exercise per Week: Not on file    Minutes of Exercise per Session: Not on file   Stress:     Feeling of Stress : Not on file   Social Connections:     Frequency of Communication with Friends and Family: Not on file    Frequency of Social Gatherings with Friends and Family: Not on file    Attends Yarsanism Services: Not on file    Active Member of [address] [place] [address] or Organizations: Not on file    Attends Club or Organization Meetings: Not on file    Marital Status: Not on file   Intimate Partner Violence:     Fear of Current or Ex-Partner: Not on file    Emotionally Abused: Not on file    Physically Abused: Not on file    Sexually Abused: Not on file   Housing Stability:     Unable to Pay for Housing in the Last Year: Not on file    Number of [place] in the Last Year: Not on file    Unstable Housing in the Last Year: Not on file       Medications:   Medications:    azelastine  1 spray Each Nostril BID    metoprolol tartrate  25 mg Oral BID    predniSONE  40 mg Oral Daily    insulin lispro  0-6 Units SubCUTAneous TID WC    insulin lispro  0-3 Units SubCUTAneous Nightly    sodium chloride flush  5-40 mL IntraVENous 2 times per day    pantoprazole  40 mg IntraVENous Daily    nicotine  1 patch TransDERmal Daily      Infusions:    sodium chloride       PRN Meds: HYDROmorphone, 0.5 mg, Q4H PRN  ipratropium-albuterol, 1 ampule, Q4H PRN  sodium chloride flush, 5-40 mL, PRN  sodium chloride, 25 mL, PRN  ondansetron, 4 mg, Q8H PRN   Or  ondansetron, 4 mg, Q6H PRN  acetaminophen, 650 mg, Q6H PRN   Or  acetaminophen, 650 mg, Q6H PRN  albuterol sulfate HFA, 2 puff, Q6H PRN          Labs      Recent Results (from the past 24 hour(s))   CBC with Auto Differential    Collection Time: 03/28/22  1:31 AM   Result Value Ref Range    WBC 17.3 (H) 4.0 - 10.5 K/CU MM    RBC 4.24 4.2 - 5.4 M/CU MM    Hemoglobin 11.6 (L) 12.5 - 16.0 GM/DL    Hematocrit 36.5 (L) 37 - 47 %    MCV 86.1 78 - 100 FL    MCH 27.4 27 - 31 PG    MCHC 31.8 (L) 32.0 - 36.0 %    RDW 12.7 11.7 - 14.9 %    Platelets 190 274 - 315 K/CU MM    MPV 10.4 7.5 - 11.1 FL    Differential Type AUTOMATED DIFFERENTIAL     Segs Relative 85.2 (H) 36 - 66 %    Lymphocytes % 6.6 (L) 24 - 44 %    Monocytes % 7.5 (H) 0 - 4 %    Eosinophils % 0.1 0 - 3 %    Basophils % 0.1 0 - 1 %    Segs Absolute 14.7 K/CU MM    Lymphocytes Absolute 1.1 K/CU MM    Monocytes Absolute 1.3 K/CU MM    Eosinophils Absolute 0.0 K/CU MM    Basophils Absolute 0.0 K/CU MM    Nucleated RBC % 0.0 %    Total Nucleated RBC 0.0 K/CU MM    Total Immature Neutrophil 0.09 K/CU MM    Immature Neutrophil % 0.5 (H) 0 - 0.43 %   Comprehensive Metabolic Panel    Collection Time: 03/28/22  1:31 AM   Result Value Ref Range    Sodium 139 135 - 145 MMOL/L    Potassium 3.5 3.5 - 5.1 MMOL/L    Chloride 97 (L) 99 - 110 mMol/L    CO2 33 (H) 21 - 32 MMOL/L    BUN 13 6 - 23 MG/DL    CREATININE 0.2 (L) 0.6 - 1.1 MG/DL    Glucose 112 (H) 70 - 99 MG/DL    Calcium 9.2 8.3 - 10.6 MG/DL    Albumin 3.1 (L) 3.4 - 5.0 GM/DL    Total Protein 5.6 (L) 6.4 - 8.2 GM/DL Total Bilirubin 0.4 0.0 - 1.0 MG/DL    ALT 10 10 - 40 U/L    AST 17 15 - 37 IU/L    Alkaline Phosphatase 83 40 - 129 IU/L    GFR Non-African American >60 >60 mL/min/1.73m2    GFR African American >60 >60 mL/min/1.73m2    Anion Gap 9 4 - 16   POCT Glucose    Collection Time: 03/28/22  8:24 AM   Result Value Ref Range    POC Glucose 91 70 - 99 MG/DL   POCT Glucose    Collection Time: 03/28/22 11:34 AM   Result Value Ref Range    POC Glucose 164 (H) 70 - 99 MG/DL   Protime/INR & PTT    Collection Time: 03/28/22 12:09 PM   Result Value Ref Range    Protime 15.4 (H) 11.7 - 14.5 SECONDS    INR 1.19 INDEX    aPTT 35.8 25.1 - 37.1 SECONDS   POCT Glucose    Collection Time: 03/28/22  5:04 PM   Result Value Ref Range    POC Glucose 173 (H) 70 - 99 MG/DL   POCT Glucose    Collection Time: 03/28/22  7:32 PM   Result Value Ref Range    POC Glucose 182 (H) 70 - 99 MG/DL        Imaging/Diagnostics Last 24 Hours   No results found.       Electronically signed by Josee Collazo DO on 3/28/2022 at 9:31 PM

## 2022-03-28 NOTE — PLAN OF CARE
Nutrition Problem #1: Inadequate oral intake  Intervention: Food and/or Nutrient Delivery: Continue Current Diet,Modify Oral Nutrition Supplement  Nutritional Goals: Pt will consume greater than half of meals TID

## 2022-03-28 NOTE — PROGRESS NOTES
Comprehensive Nutrition Assessment    Type and Reason for Visit:  Reassess    Nutrition Recommendations/Plan:   · Will send oral nutrition supplement at meal time, encourage to drink between meals   · Left nutrition education handouts at visit   · Monitor GI status, pain, PO intakes, and poc     Nutrition Assessment:  Advanced diet from clears to regular diet, receiving some oral nutrition supplements, consuming 50% of meals. C/o abd pain near liver. Pt states hydrating often. Had 9-10 episodes of loose stool yesterday per pt. States had a few firm BMs between. Pt underwent polyp removal s/p c scope and revealed diverticulosis without colon ca diagnostic. D/w pt high fiber nutrition therapy and how to eat for Diverticulosis vs. for diarrhea. Encourage pt to decrease sugar sweetened beverages, noted 3 pop bottles in room. Follow as high nutrition risk. Malnutrition Assessment:  Malnutrition Status: At risk for malnutrition (Comment)    Context:  Acute Illness       Estimated Daily Nutrient Needs:  Energy (kcal):  4290-5798 (1.1-1.2 stress factor); Weight Used for Energy Requirements:  Current     Protein (g):  68 (at least 1.2 g/kg); Weight Used for Protein Requirements:  Ideal        Fluid (ml/day):  8349-9144; Method Used for Fluid Requirements:  1 ml/kcal      Nutrition Related Findings:  Cr 0.2, Glu 164, Awaiting liver bx      Wounds:  None       Current Nutrition Therapies:    ADULT DIET;  Regular  ADULT ORAL NUTRITION SUPPLEMENT; Breakfast, AM Snack, Lunch, PM Snack, Dinner, HS Snack; Standard High Calorie/High Protein Oral Supplement    Anthropometric Measures:  · Height: 5' 5\" (165.1 cm)  · Current Body Weight: 185 lb 13.6 oz (84.3 kg)   · Admission Body Weight:  (stated)    · Usual Body Weight: 190 lb (86.2 kg) (stated per pt, limited wt hx)     · Ideal Body Weight: 125 lbs; % Ideal Body Weight 149 %   · BMI: 30.9  · Adjusted Body Weight:  ; No Adjustment   · Adjusted BMI:      · BMI Categories: Obese Class 1 (BMI 30.0-34. 9)       Nutrition Diagnosis:   · Inadequate oral intake related to altered GI structure,catabolic illness,pain as evidenced by poor intake prior to admission,intake 26-50%,diarrhea    Nutrition Interventions:   Food and/or Nutrient Delivery:  Continue Current Diet,Modify Oral Nutrition Supplement  Nutrition Education/Counseling:  Education completed (High fiber nutrition therapy and antidiarrheal nutrition tips)   Coordination of Nutrition Care:  Continue to monitor while inpatient    Goals:  Pt will consume greater than half of meals TID       Nutrition Monitoring and Evaluation:   Behavioral-Environmental Outcomes:  Beliefs and Attitutes,Knowledge or Skill,Readiness for Change   Food/Nutrient Intake Outcomes:  Diet Advancement/Tolerance,Food and Nutrient Intake,Supplement Intake  Physical Signs/Symptoms Outcomes:  Biochemical Data,GI Status,Diarrhea,Hemodynamic Status,Meal Time Behavior,Weight     Discharge Planning:     Too soon to determine     Electronically signed by Cinthia Louis RD, LD on 3/28/22 at 12:45 PM EDT    Contact:[phone]

## 2022-03-28 NOTE — CARE COORDINATION
Reviewed chart and discussed in IDR, pt up and independent in room. Son very attentive to pt here at [facility]. Plan remains home with son, no needs identified.

## 2022-03-28 NOTE — PROGRESS NOTES
ONCOLOGY HEMATOLOGY CARE ([facility])  PROGRESS NOTE      Patient was seen and examined today. Not in any acute distress and no overnight events. Colonoscopy is not diagnostic. Her CEA is elevated at 29.9. I requested CT guided biopsy of liver lesion and hopefully to be done today. She doesn't have any new complaints. PHYSICAL EXAM    Vitals: BP (!) 142/70   Pulse 84   Temp 97.8 °F (36.6 °C) (Oral)   Resp 16   Ht 5' 5\" (1.651 m)   Wt 185 lb 12.8 oz (84.3 kg)   SpO2 94%   BMI 30.92 kg/m²   CONSTITUTIONAL: awake, alert, cooperative, no apparent distress   EYES: pupils equal, round and reactive to light, sclera clear and conjunctiva normal  ENT: Normocephalic, without obvious abnormality, atraumatic  NECK: supple, symmetrical, no jugular venous distension and no carotid bruits   HEMATOLOGIC/LYMPHATIC: no cervical, supraclavicular or axillary lymphadenopathy   LUNGS: no increased work of breathing and clear to auscultation   CARDIOVASCULAR: regular rate and rhythm, normal S1 and S2, no murmur noted  ABDOMEN: normal bowel sounds x 4, soft, non-distended, non-tender, no masses palpated, no hepatosplenomgaly   MUSCULOSKELETAL: full range of motion noted, tone is normal  NEUROLOGIC: awake, alert, oriented to name, place and time. Motor skills grossly intact. SKIN: Normal skin color, texture, turgor and no jaundice. appears intact   EXTREMITIES: no LE edema     LABORATORY RESULTS  CBC: Recent Labs     03/27/22 1425 03/28/22  0131   WBC 17.2* 17.3*   HGB 12.3* 11.6*    320     BMP:    Recent Labs     03/27/22 1425 03/28/22  0131    139   K 3.6 3.5   CL 95* 97*   CO2 31 33*   BUN 12 13   CREATININE 0.3* 0.2*   GLUCOSE 174* 112*     Hepatic:   Recent Labs     03/27/22 1425 03/28/22  0131   AST 18 17   ALT 10 10   BILITOT 0.4 0.4   ALKPHOS 83 83     INR: No results for input(s): INR in the last 72 hours.     ASSESSMENT  Probable colon cancer with diffuse metastasis     RECOMMENDATION  Colonoscopy is non diagnostic. Her CEA is elevated at 29.9. I requested CT guided biopsy of liver lesion and hopefully to be done today. If she will be discharged after biopsy, we can follow her as an out patient. Ti-RADS 4 thyroid nodules - endocrinology is on the case. May need out patient FNA at a later date. We will continue to follow the patient. Thank you.

## 2022-03-28 NOTE — PROCEDURES
PROCEDURE PERFORMED: Liver biopsy by Dr Lopez Look: liver lesion    INFORMED CONSENT:  Obtained prior to procedure. Consent placed in chart. GAVINO SCORE PRE PROCEDURE:   10     PT TRANSPORTED FROM:    4102                                TO THE IR ROOM:  Large                      ARRIVED TO ROOM: 1520    ASSESSMENT: Pt A&OX4; verbalizes understanding of procedure    STAFF PRESENT: Alexander Heath RN, Kelsy RN, [facility] RN(scrub)    BARRIER PRECAUTIONS & STERILE TECHNIQUE:               Pt in bed and positioned for comfort. Warm blankets given. Pt placed on VS Monitor. Pt supine, prepped and draped in a sterile fashion with chlorhexadine.     TIME OUT:  1540    PROCEDURE STARTED: 1541    PAIN/LOCAL ANESTHESIA/SEDATION MANAGEMENT:           Local: Lidocaine 1% given by Dr          Sedation: None             Fentanyl:             Versed:     INTRAOPERATIVE:           ACCESS TIME:           US/FLUORO: U/S guided          WIRE USED:           SHEATH USED:           CATHETER USED:           FINAL IMAGE TAKEN TO CONFIRM PLACEMENT OF:           CONTRAST/CC:   Biopsy needle: 18g X 15cm    STERILE DRESSINGS: Band-aid applied to site by Dr Clarke Cooler: 4 cores obtained; cytology present    EBL:  <1cc     FOLLOW- UP X-RAY: NA    PROCEDURE ENDED: 4396    COMPLICATIONS/ OUTCOME: None; tolerated procedure well    GAVINO SCORE POST PROCEDURE:  10        LEFT THE ROOM: 9271                                                         REPORT CALLED TO: Hector Curtis LPN, pt nurse

## 2022-03-28 NOTE — PLAN OF CARE
Problem: Falls - Risk of:  Goal: Will remain free from falls  Description: Will remain free from falls  Outcome: Ongoing  Goal: Absence of physical injury  Description: Absence of physical injury  Outcome: Ongoing     Problem: Activity:  Goal: Risk for activity intolerance will decrease  Description: Risk for activity intolerance will decrease  Outcome: Ongoing     Problem:  Bowel/Gastric:  Goal: Bowel function will improve  Description: Bowel function will improve  Outcome: Ongoing  Goal: Diagnostic test results will improve  Description: Diagnostic test results will improve  Outcome: Ongoing  Goal: Occurrences of nausea will decrease  Description: Occurrences of nausea will decrease  Outcome: Ongoing  Goal: Occurrences of vomiting will decrease  Description: Occurrences of vomiting will decrease  Outcome: Ongoing     Problem: Fluid Volume:  Goal: Maintenance of adequate hydration will improve  Description: Maintenance of adequate hydration will improve  Outcome: Ongoing     Problem: Health Behavior:  Goal: Ability to state signs and symptoms to report to health care provider will improve  Description: Ability to state signs and symptoms to report to health care provider will improve  Outcome: Ongoing     Problem: Physical Regulation:  Goal: Complications related to the disease process, condition or treatment will be avoided or minimized  Description: Complications related to the disease process, condition or treatment will be avoided or minimized  Outcome: Ongoing  Goal: Ability to maintain clinical measurements within normal limits will improve  Description: Ability to maintain clinical measurements within normal limits will improve  Outcome: Ongoing     Problem: Sensory:  Goal: Ability to identify factors that increase the pain will improve  Description: Ability to identify factors that increase the pain will improve  Outcome: Ongoing  Goal: Ability to notify healthcare provider of pain before it becomes unmanageable or unbearable will improve  Description: Ability to notify healthcare provider of pain before it becomes unmanageable or unbearable will improve  Outcome: Ongoing  Goal: Pain level will decrease  Description: Pain level will decrease  Outcome: Ongoing

## 2022-03-28 NOTE — PROGRESS NOTES
[facility]    PATIENT: Julianna Red, 73 y.o., female, MRN: [id number]    Hospital Day:  LOS: 4 days     Julianna Red is a 73 y.o. female with blood per rectum, imaging concerning for carcinomatosis             Subjective:  Chief Complaint: right upper quadrant pain  Pain: controlled  BM:    Diet: ADULT DIET; Regular  ADULT ORAL NUTRITION SUPPLEMENT; Breakfast, AM Snack, Lunch, PM Snack, Dinner, HS Snack; Standard High Calorie/High Protein Oral Supplement  Activity: as tolerated    Tentatively getting an IR biopsy of the liver today. Denies N/V. Objective:    Vitals: BP (!) 142/70   Pulse 84   Temp 97.8 °F (36.6 °C) (Oral)   Resp 16   Ht 5' 5\" (1.651 m)   Wt 185 lb 12.8 oz (84.3 kg)   SpO2 94%   BMI 30.92 kg/m²   Vital Signs (Last 24 Hours)  Temp  Av.9 °F (36.6 °C)  Min: 97.7 °F (36.5 °C)  Max: 98.2 °F (36.8 °C)  Pulse  Av  Min: 75  Max: 92  BP  Min: 128/67  Max: 142/70  Resp  Av.5  Min: 16  Max: 18  SpO2  Av %  Min: 93 %  Max: 98 %  Wt Readings from Last 3 Encounters:   22 185 lb 12.8 oz (84.3 kg)   17 244 lb (110.7 kg)   10/20/17 246 lb (111.6 kg)       I/O: No intake/output data recorded.     IV Fluids: sodium chloride    Scheduled Meds: azelastine, 1 spray, Each Nostril, BID    metoprolol tartrate, 25 mg, Oral, BID    predniSONE, 40 mg, Oral, Daily    insulin lispro, 0-6 Units, SubCUTAneous, TID WC    insulin lispro, 0-3 Units, SubCUTAneous, Nightly    sodium chloride flush, 5-40 mL, IntraVENous, 2 times per day    pantoprazole, 40 mg, IntraVENous, Daily    nicotine, 1 patch, TransDERmal, Daily    Physical Exam:  General Appearance:   Alert, cooperative, no distress    Head:   Normocephalic, atraumatic    Lungs:    Equal chest rise, respirations unlabored   Heart:   Regular rate and rhythm    Abdomen:    Soft, RUQ tender, no rebound or guarding    Extremities:  No cyanosis or edema    Neurologic:  Nonfocal, 8:28 PM   Result Value Ref Range    POC Glucose 148 (H) 70 - 99 MG/DL   CBC with Auto Differential    Collection Time: 03/28/22  1:31 AM   Result Value Ref Range    WBC 17.3 (H) 4.0 - 10.5 K/CU MM    RBC 4.24 4.2 - 5.4 M/CU MM    Hemoglobin 11.6 (L) 12.5 - 16.0 GM/DL    Hematocrit 36.5 (L) 37 - 47 %    MCV 86.1 78 - 100 FL    MCH 27.4 27 - 31 PG    MCHC 31.8 (L) 32.0 - 36.0 %    RDW 12.7 11.7 - 14.9 %    Platelets 593 766 - 636 K/CU MM    MPV 10.4 7.5 - 11.1 FL    Differential Type AUTOMATED DIFFERENTIAL     Segs Relative 85.2 (H) 36 - 66 %    Lymphocytes % 6.6 (L) 24 - 44 %    Monocytes % 7.5 (H) 0 - 4 %    Eosinophils % 0.1 0 - 3 %    Basophils % 0.1 0 - 1 %    Segs Absolute 14.7 K/CU MM    Lymphocytes Absolute 1.1 K/CU MM    Monocytes Absolute 1.3 K/CU MM    Eosinophils Absolute 0.0 K/CU MM    Basophils Absolute 0.0 K/CU MM    Nucleated RBC % 0.0 %    Total Nucleated RBC 0.0 K/CU MM    Total Immature Neutrophil 0.09 K/CU MM    Immature Neutrophil % 0.5 (H) 0 - 0.43 %   Comprehensive Metabolic Panel    Collection Time: 03/28/22  1:31 AM   Result Value Ref Range    Sodium 139 135 - 145 MMOL/L    Potassium 3.5 3.5 - 5.1 MMOL/L    Chloride 97 (L) 99 - 110 mMol/L    CO2 33 (H) 21 - 32 MMOL/L    BUN 13 6 - 23 MG/DL    CREATININE 0.2 (L) 0.6 - 1.1 MG/DL    Glucose 112 (H) 70 - 99 MG/DL    Calcium 9.2 8.3 - 10.6 MG/DL    Albumin 3.1 (L) 3.4 - 5.0 GM/DL    Total Protein 5.6 (L) 6.4 - 8.2 GM/DL    Total Bilirubin 0.4 0.0 - 1.0 MG/DL    ALT 10 10 - 40 U/L    AST 17 15 - 37 IU/L    Alkaline Phosphatase 83 40 - 129 IU/L    GFR Non-African American >60 >60 mL/min/1.73m2    GFR African American >60 >60 mL/min/1.73m2    Anion Gap 9 4 - 16   POCT Glucose    Collection Time: 03/28/22  8:24 AM   Result Value Ref Range    POC Glucose 91 70 - 99 MG/DL         Assessment:  Alejandrina Amaya is a 73 y.o. female with blood per rectum, imaging concerning for carcinomatosis     Principal Problem:    GI bleed  Active Problems:    Polyp of sigmoid colon  Resolved Problems:    * No resolved hospital problems. *      Plan:  · Discussed findings and options with Td Marin. Her colonoscopy did not reveal a lesion which could be biopsied, but her CEA is elevated. · Tentatively getting an IR biopsy of the liver today  · Will await results to determine if a mediport is needed. Can be done as an outpatient depending on plans.  Will continue to follow  · Thank you for the consultation and the opportunity to care for Td Marin    Electronically signed: Anselmo Gomez MD 3/28/2022 10:11 AM

## 2022-03-29 NOTE — PROGRESS NOTES
Patient's 02 sitting in bed without oxygen on was 90%. When she walked in the ferreira her oxygen stayed at 87-88% without 02 on.

## 2022-03-29 NOTE — PROGRESS NOTES
Progress Note( Dr. Kannan Barrios)  3/28/2022  Subjective:   Admit Date: 3/23/2022  PCP: CANDELARIA Montez CNP    Admitted For : abdominal pain// GI bleeding    Consulted For: Possible thyroid dysfunction with elevated serum free T4    Interval History: Is being investigated for possible colorectal tumor with metastasis to the liver. Patient had colonoscopy  There is no mass noted except some colonic polyps that were biopsied  The thyroid function test ordered but not available  Also had thyroid ultrasound done which is results described below    For the GI malignancy status awaiting input from oncology and general surgery    Denies any chest pains,   Denies SOB . Denies nausea or vomiting. No new bowel or bladder symptoms. No intake or output data in the 24 hours ending 03/28/22 2239    DATA    CBC:   Recent Labs     03/27/22  1425 03/28/22  0131   WBC 17.2* 17.3*   HGB 12.3* 11.6*    320    CMP:  Recent Labs     03/27/22  1425 03/28/22  0131    139   K 3.6 3.5   CL 95* 97*   CO2 31 33*   BUN 12 13   CREATININE 0.3* 0.2*   CALCIUM 8.9 9.2   PROT 5.5* 5.6*   LABALBU 3.0* 3.1*   BILITOT 0.4 0.4   ALKPHOS 83 83   AST 18 17   ALT 10 10     Lipids: No results found for: CHOL, HDL, TRIG  Glucose:  Recent Labs     03/28/22  1134 03/28/22  1704 03/28/22  1932   POCGLU 164* 173* 182*     [id number]:No results found for: LABA1C  High Sensitivity TSH:   Lab Results   Component Value Date    TSHHS <0.010 03/27/2022     Free T3:   Lab Results   Component Value Date    FT3 2.4 03/27/2022     Free T4:  Lab Results   Component Value Date    T4FREE 1.73 03/27/2022       US HEAD NECK SOFT TISSUE THYROID   Final Result   Multinodular goiter with bilateral 2.3 cm TI-RADS 4 nodules measuring 2.3 cm. These are moderately suspicious. FNA is recommended given size greater than   1.5 cm.          XR CHEST PORTABLE   Final Result   Left lower lobe airspace opacity could represent atelectasis or pneumonia CT ABDOMEN PELVIS W IV CONTRAST Additional Contrast? None   Final Result      1. Note is made of diffuse liver metastasis and diffuse peritoneal and   omental and mesenteric metastasis as well as possibly some upper   retroperitoneal metastatic adenopathy. Though not definitive, this likely is   related to a suspected colon carcinoma involving the mid to distal sigmoid   colon with adjacent spread into the left pelvis versus adenopathy. 2.  Small amount of fluid noted within the pelvis. 3.  Somewhat distended gallbladder thought to contain gallstones, 1 of which   may be within the gallbladder neck versus mural calcification. IR BIOPSY LIVER PERCUTANEOUS    (Results Pending)        Scheduled Medicines   Medications:    azelastine  1 spray Each Nostril BID    metoprolol tartrate  25 mg Oral BID    predniSONE  40 mg Oral Daily    insulin lispro  0-6 Units SubCUTAneous TID WC    insulin lispro  0-3 Units SubCUTAneous Nightly    sodium chloride flush  5-40 mL IntraVENous 2 times per day    pantoprazole  40 mg IntraVENous Daily    nicotine  1 patch TransDERmal Daily      Infusions:    sodium chloride           Objective:   Vitals: /64   Pulse 79   Temp 98 °F (36.7 °C) (Oral)   Resp 18   Ht 5' 5\" (1.651 m)   Wt 185 lb 12.8 oz (84.3 kg)   SpO2 95%   BMI 30.92 kg/m²   General appearance: alert and cooperative with exam  Neck: no JVD or bruit  Thyroid multinodular goiter  Lungs: Has Vesicular Breath sounds   Heart:  regular rate and rhythm  Abdomen: soft, non-tender; bowel sounds normal; no masses,  no organomegaly  Musculoskeletal: Normal  Extremities: extremities normal, , no edema  Neurologic:  Awake, alert, oriented to name, place and time. Cranial nerves II-XII are grossly intact. Motor is  intact. Sensory is intact. ,  and gait is normal.    Assessment:     Patient Active Problem List:     GI bleed     Polyp of sigmoid colon     Thyroid dysfunction/hyperthyroidism/multinodular goiter of bilateral      Has liver metastasis? Plan:     1. Reviewed POC blood glucose . Labs and X ray results   2. Reviewed Current Medicines   3. Patient had multinodular goiter bilaterally that nodules over 2 cm but needs to have thyroid biopsy done to be done at a later date  4. Thyroid function test pending  5. GI oncology are working up possible colonic cancer with metastasis  6. The patient's colonoscopy was negative but the data strongly suggestive of colonic cancer with liver metastasis  7. Will follow     .      Radha Brewer MD, MD

## 2022-03-29 NOTE — PROGRESS NOTES
ONCOLOGY HEMATOLOGY CARE ([facility])  PROGRESS NOTE      Patient was seen and examined today. Not in any acute distress and no overnight events. Colonoscopy is not diagnostic. Her CEA is elevated at 29.9. CT guided biopsy of liver lesion was done on 3/28/22. Will follow up with the results. She doesn't have any new complaints. PHYSICAL EXAM    Vitals: /82   Pulse 91   Temp 98.5 °F (36.9 °C) (Oral)   Resp 18   Ht 5' 5\" (1.651 m)   Wt 195 lb (88.5 kg)   SpO2 93%   BMI 32.45 kg/m²   CONSTITUTIONAL: awake, alert, cooperative, no apparent distress   EYES: pupils equal, round and reactive to light, sclera clear and conjunctiva normal  ENT: Normocephalic, without obvious abnormality, atraumatic  NECK: supple, symmetrical, no jugular venous distension and no carotid bruits   HEMATOLOGIC/LYMPHATIC: no cervical, supraclavicular or axillary lymphadenopathy   LUNGS: no increased work of breathing and clear to auscultation   CARDIOVASCULAR: regular rate and rhythm, normal S1 and S2, no murmur noted  ABDOMEN: normal bowel sounds x 4, soft, non-distended, non-tender, no masses palpated, no hepatosplenomgaly   MUSCULOSKELETAL: full range of motion noted, tone is normal  NEUROLOGIC: awake, alert, oriented to name, place and time. Motor skills grossly intact. SKIN: Normal skin color, texture, turgor and no jaundice.  appears intact   EXTREMITIES: no LE edema     LABORATORY RESULTS  CBC:   Recent Labs     03/27/22  1425 03/28/22  0131 03/29/22  0705   WBC 17.2* 17.3* 14.8*   HGB 12.3* 11.6* 13.1    320 414     BMP:    Recent Labs     03/27/22  1425 03/28/22  0131 03/29/22  0705    139 140   K 3.6 3.5 3.6   CL 95* 97* 93*   CO2 31 33* 33*   BUN 12 13 11   CREATININE 0.3* 0.2* 0.2*   GLUCOSE 174* 112* 82     Hepatic:   Recent Labs     03/27/22  1425 03/28/22  0131 03/29/22  0705   AST 18 17 24   ALT 10 10 13   BILITOT 0.4 0.4 0.6   ALKPHOS 83 83 94     INR:   Recent Labs     03/28/22  1209   INR 1. 23       ASSESSMENT  Probable colon cancer with diffuse metastasis     RECOMMENDATION  Colonoscopy is non diagnostic. Her CEA is elevated at 29.9. CT guided biopsy of liver lesion was done on 3/28/22. Will follow up with the results. If pathology is consistent with metastatic disease, will discuss with her about chemotherapy. Ti-RADS 4 thyroid nodules - endocrinology is on the case. May need out patient FNA at a later date. We will continue to follow the patient. Thank you.

## 2022-03-30 NOTE — PROGRESS NOTES
Pt had labs drawn 03/30/2022 @ 0630. Pt states when the needle was withdrawn it was extremely painful. Upon assessment of the right a\c it is swollen to golf ball sized and extremely tender. Perfect served Radhames Kearney NP to notify.

## 2022-03-30 NOTE — PLAN OF CARE
Problem: Falls - Risk of:  Goal: Will remain free from falls  Description: Will remain free from falls  Outcome: Ongoing  Goal: Absence of physical injury  Description: Absence of physical injury  Outcome: Ongoing     Problem: Activity:  Goal: Risk for activity intolerance will decrease  Description: Risk for activity intolerance will decrease  Outcome: Ongoing     Problem:  Bowel/Gastric:  Goal: Bowel function will improve  Description: Bowel function will improve  Outcome: Ongoing  Goal: Diagnostic test results will improve  Description: Diagnostic test results will improve  Outcome: Ongoing  Goal: Occurrences of nausea will decrease  Description: Occurrences of nausea will decrease  Outcome: Ongoing  Goal: Occurrences of vomiting will decrease  Description: Occurrences of vomiting will decrease  Outcome: Ongoing     Problem: Fluid Volume:  Goal: Maintenance of adequate hydration will improve  Description: Maintenance of adequate hydration will improve  Outcome: Ongoing     Problem: Health Behavior:  Goal: Ability to state signs and symptoms to report to health care provider will improve  Description: Ability to state signs and symptoms to report to health care provider will improve  Outcome: Ongoing     Problem: Physical Regulation:  Goal: Complications related to the disease process, condition or treatment will be avoided or minimized  Description: Complications related to the disease process, condition or treatment will be avoided or minimized  Outcome: Ongoing  Goal: Ability to maintain clinical measurements within normal limits will improve  Description: Ability to maintain clinical measurements within normal limits will improve  Outcome: Ongoing     Problem: Sensory:  Goal: Ability to identify factors that increase the pain will improve  Description: Ability to identify factors that increase the pain will improve  Outcome: Ongoing  Goal: Ability to notify healthcare provider of pain before it becomes unmanageable or unbearable will improve  Description: Ability to notify healthcare provider of pain before it becomes unmanageable or unbearable will improve  Outcome: Ongoing  Goal: Pain level will decrease  Description: Pain level will decrease  Outcome: Ongoing     Problem: Nutrition  Goal: Optimal nutrition therapy  Outcome: Ongoing

## 2022-03-30 NOTE — PROGRESS NOTES
Messaged  regarding delay in Palliative Care consult. Patient has plans for d/c home with oxygen. They will speak with PCP about HHC.  plans to d/c patient tomorrow if pain is controlled.

## 2022-03-30 NOTE — PLAN OF CARE
Problem: Nutrition  Goal: Optimal nutrition therapy  3/30/2022 1123 by Terrance Falk LPN  Outcome: Ongoing  3/30/2022 0140 by Lynne Pugh LPN  Outcome: Ongoing     Problem: Falls - Risk of:  Goal: Will remain free from falls  Description: Will remain free from falls  3/30/2022 1123 by Terrance Falk LPN  Outcome: Ongoing  3/30/2022 0140 by Lynne Pugh LPN  Outcome: Ongoing  Goal: Absence of physical injury  Description: Absence of physical injury  3/30/2022 1123 by Terrance Falk LPN  Outcome: Ongoing  3/30/2022 0140 by Lynne Pugh LPN  Outcome: Ongoing     Problem: Activity:  Goal: Risk for activity intolerance will decrease  Description: Risk for activity intolerance will decrease  3/30/2022 1123 by Terrance Falk LPN  Outcome: Ongoing  3/30/2022 0140 by Lynne Pugh LPN  Outcome: Ongoing     Problem:  Bowel/Gastric:  Goal: Bowel function will improve  Description: Bowel function will improve  3/30/2022 1123 by Terrance Falk LPN  Outcome: Ongoing  3/30/2022 0140 by Lynne Pugh LPN  Outcome: Ongoing  Goal: Diagnostic test results will improve  Description: Diagnostic test results will improve  3/30/2022 1123 by Terrance Falk LPN  Outcome: Ongoing  3/30/2022 0140 by Lynne Pugh LPN  Outcome: Ongoing  Goal: Occurrences of nausea will decrease  Description: Occurrences of nausea will decrease  3/30/2022 1123 by Terrance Falk LPN  Outcome: Ongoing  3/30/2022 0140 by Lynne Pugh LPN  Outcome: Ongoing  Goal: Occurrences of vomiting will decrease  Description: Occurrences of vomiting will decrease  3/30/2022 1123 by Terrance Falk LPN  Outcome: Ongoing  3/30/2022 0140 by Lynne Pugh LPN  Outcome: Ongoing     Problem: Fluid Volume:  Goal: Maintenance of adequate hydration will improve  Description: Maintenance of adequate hydration will improve  3/30/2022 1123 by Terrance Falk LPN  Outcome: Ongoing  3/30/2022 0140 by Lynne Pugh LPN  Outcome: Ongoing     Problem: Health Behavior:  Goal: Ability to state signs and symptoms to report to health care provider will improve  Description: Ability to state signs and symptoms to report to health care provider will improve  3/30/2022 1123 by Ernesto Littlejohn LPN  Outcome: Ongoing  3/30/2022 0140 by Ludivina Barrera LPN  Outcome: Ongoing     Problem: Physical Regulation:  Goal: Complications related to the disease process, condition or treatment will be avoided or minimized  Description: Complications related to the disease process, condition or treatment will be avoided or minimized  3/30/2022 1123 by Ernesto Littlejohn LPN  Outcome: Ongoing  3/30/2022 0140 by Ludivina Barrera LPN  Outcome: Ongoing  Goal: Ability to maintain clinical measurements within normal limits will improve  Description: Ability to maintain clinical measurements within normal limits will improve  3/30/2022 1123 by Ernesto Littlejohn LPN  Outcome: Ongoing  3/30/2022 0140 by Ludivina Barrera LPN  Outcome: Ongoing     Problem: Sensory:  Goal: Ability to identify factors that increase the pain will improve  Description: Ability to identify factors that increase the pain will improve  3/30/2022 1123 by Ernesto Littlejohn LPN  Outcome: Ongoing  3/30/2022 0140 by Ludivina Barrera LPN  Outcome: Ongoing  Goal: Ability to notify healthcare provider of pain before it becomes unmanageable or unbearable will improve  Description: Ability to notify healthcare provider of pain before it becomes unmanageable or unbearable will improve  3/30/2022 1123 by Ernesto Littlejohn LPN  Outcome: Ongoing  3/30/2022 0140 by Ludivina Barrera LPN  Outcome: Ongoing  Goal: Pain level will decrease  Description: Pain level will decrease  3/30/2022 1123 by Ernesto Littlejohn LPN  Outcome: Ongoing  3/30/2022 0140 by Ludivina Barrera LPN  Outcome: Ongoing

## 2022-03-30 NOTE — PROGRESS NOTES
3/30/2022 7:34 AM  Patient Room #: 5594/7337-A  Patient Name: Galileo Sawyer    (Step 1 Done by RN if possible otherwise call Pulmonary Diagnostics)  1. Place patient on room air at rest for at least 30 minutes. If patient falls below 88% before 30 minutes then you can record the level and stop. Record room air saturation level _95_ %. If patient is at 88% or below, they will qualify for home oxygen and you can stop. If level does not fall below 88%, fill in level above. If indicated continue to Step 2. Signature:_Macy Treviño, RRT__ Date: 3/30/2022_  (Step 2&3 Done by [facility])  2. Ambulate patient on room air until saturation falls below 89%. Record level of room air saturation with ambulation_84_ %. Next, place patient back on _2_lpm oxygen and ambulate, record level _92_%. (Note:  this level must show improvement from room air level done with ambulation.)  If patients saturation on room air with ambulation is 88% or below AND patient shows improvement with oxygen during ambulation, they will qualify for home oxygen and you can stop. If patient does not drop below 89%, then patient should have an overnight oximetry trending on room air to see if level falls below 88%. Complete level in Step 3 below. 3. Room air overnight oximetry level 88 % for___  cumulative minutes. If patients room air oxygen level is < 89% for at least 5 cumulative minutes, patient will qualify for home oxygen and you can stop. (Attach Night Trending Report)    Complete order below: Diagnosis:_COPD_  Home oxygen at:  Length of Need: ?X Lifetime ?  3 Months     _2__lpm or __%   via  [x] nasal cannula  []mask  [] other: Conserving Device         [x]continuous [x]  with activity  [x]  Nocturnal   [x] Portable Tanks [x]  Concentrator  [x] Conserving Device        Therapist Signature:_Macy Treviño, RRT_     Date:  3/30/2022___  Physician Signature:  _Electronically Signed in EMR_    Date:___  Physician Printed Name:  Temitope Dai   NPI:  [id number]_    [x] Patient Qualifies      [] Patient Does NOT qualify

## 2022-03-30 NOTE — CARE COORDINATION
Reviewed chart and discussed in IDR, then spoke with pt . She is agreeable to [facility] to follow her at home. Discussed options and ok with [facility]. PS to Kari Kaba and Dr Shannan Yoder    36 Padmini Smalls care orders obtained. 1610- Spoke with Jennifer RUIZ and pt has not seen Dr Zach Peña in 4-5 yrs so will not follow. Spoke with pt about home care and she states that going home with family to help her would be ok. She plans on following up with a PCP and they will get [facility] [employer] [facility]. through them . Pt denies any needs at this time.

## 2022-03-30 NOTE — ADT AUTH CERT
Utilization Reviews         Gastrointestinal Bleeding, Lower - Care Day 7 (3/30/2022) by Antwan Pérez RN       Review Status Review Entered   Completed 3/30/2022 14:39      Criteria Review      Care Day: 7 Care Date: 3/30/2022 Level of Care: Telemetry    Guideline Day 2    Clinical Status    (X) * Hypotension absent    3/30/2022 2:39 PM EDT by Harry Arellano      Vitals:  98 (36.7) 15 83 146/97  91% RA    (X) * Bleeding absent or reduced    Activity    (X) Activity as tolerated    3/30/2022 2:39 PM EDT by Harry Arellano      UAT    Routes    (X) * Oral hydration tolerated    (X) * Oral diet tolerated    3/30/2022 2:39 PM EDT by Harry Arellano      regular    Interventions    (X) Hgb/Hct    3/30/2022 2:39 PM EDT by Harry Arellano      Hemoglobin Quant: 12.8  Hematocrit: 39.2    * Milestone   Additional Notes   DATE: 3/30/33         Abnl/Pertinent Labs/Radiology/Diagnostic Studies:   3/30/2022 00:08   Color, UA: YELLOW   Clarity, UA: CLEAR   Bilirubin, Urine: NEGATIVE   Ketones, Urine: 15 (A)   Specific Gravity, UA: 1.025   Blood, Urine: TRACE (A)   Protein, UA: TRACE (A)   Urobilinogen, Urine: NORMAL   Leukocyte Esterase, Urine: NEGATIVE   Glucose, Urine: NEGATIVE   Nitrite Urine, Quantitative: NEGATIVE   pH, Urine: 6.5   Mucus, UA: RARE (A)   WBC, UA: <1   RBC, UA: 1   Squam Epithel, UA: <1   Bacteria, UA: NEGATIVE   URINALYSIS WITH REFLEX TO CULTURE: Rpt (A)         3/30/2022 06:05   Sodium: 139   Potassium: 3.6   Chloride: 93 (L)   CO2: 31   BUN,BUNPL: 12   Creatinine: 0.2 (L)   Anion Gap: 15   Magnesium: 1.8   GLUCOSE, FASTING,GF: 87   CALCIUM, SERUM, 775787: 9.3   Total Protein: 5.9 (L)   Procalcitonin: 1.06   Albumin: 3.0 (L)   Alk Phos: 103   ALT: 17   AST: 25   Bilirubin: 0.5   WBC: 16.1 (H)   RBC: 4.65   Platelet Count: 394 (H)         -------------------------------------------------   Physical Exam:   Eyes: EOMI   ENT: neck supple   Cardiovascular: Regular rate.    Respiratory: Clear to auscultation   Gastrointestinal: Soft, non tender   Genitourinary: no suprapubic tenderness   Musculoskeletal: No edema   Skin: warm, dry   Neuro: Alert. Psych: Mood appropriate.    -------------------------------------------------   MD Consults/Assessments & Plans:   IM-   Admitted for: GI bleed        Josee Collazo is a 73 y.o. female who presents with GI bleed   Home oxygen being arranged. Levaquin started for PNA.  Patient did require dilaudid this morning. Has being doing well on tylenol.  Plan for discharge tomorrow if pain under control and Home oxygen arranged. Assessment and Plan:   Josee Collazo is a 73 y.o. female with presents with GI bleed        GI bleed, stable, resolved. -Patient complaining of melanotic stools on admission   -Hemoglobin stable   -Monitor H&H   -Type and screen ordered. -GI & GS following.       Suspected primary colon cancer with metastasis   -CT abdomen/pelvis-metastatic disease likely related to suspected colon cancer involving the mid to distal sigmoid colon with adjacent spread into the left pelvis versus adenopathy.  Diffuse liver metastasis and diffuse peritoneal and omental and mesenteric metastasis. -Patient made aware of CT findings.   -Consult general surgery & Heme Onc   -Offered IV opioid medication for abdominal pain.  Patient declined. - Colonoscopy revealed no lesions for biopsy. [place] elevated @29.9     - IR liver biopsy pending results.     - Patient may require mediport.   -Palliative care consult to help patient decide care goals and symptom management.    - Patient prefers to go home and follow up outpatient. Darin Alas is agreeable to home health for PT/OT   -GS, Heme/Onc & Endo agreeable to outpatient follow up.    - Mediport if needed can be placed outpatient   - Follow outpatient for iver biopsy results    - Follow outpatient with endocrinology for thyroid biopsy   - UA, Procal, repeat CXR, pending.   - UA pending       Acute respiratory failure 87% on ambulation, 90% a rest.   Qualify for home O2       SIRS, Possible HAP PNA. - Worsening Leukocytosis possibly secondary to metastatic cancer and recent steroid use. - Will monitor   -CT abdomen/pelvis-no acute abnormality of the lower chest noted. -UA not suggestive of infection, Procal 1.82, Repeat CXR clear, Pt afebrile.    -Chest x-ray Left lower lobe airspace opacity could represent atelectasis or pneumonia 3/26   - No CXR on admission to compare possible hospital associated pneumonia. -Given immunosenecent state with active cancer.  Will cover for potential PNA. -Start Levaquin for empiric coverage.   -Monitor with repeat CBC.       Hypokalemia resolved, Hypomagnesemia repleated   - monitor and replete PRN       Multinodular bpfran-VHH-[id number] nodule   -As per the documentation-11/2017 patient's TSH was 0.0006 with hyperthyroidism.     -TSH < 0.010, fT4 1.73, fT3 2.4    - Ti-RADS 4 thyroid nodules, differ timing of FNA to Heme/onc & Endocrinology at this time. endocrinology & heme onc following       COPD not in exacerbation   -Not on home oxygen   -Does not use inhalers at home. - Patient had wheezing early in admission and required 3L NC.   - Currently on 2L   - Continue Oral steroid x 5 days. Stop 3/29/22   - Continue Albuterol & PRN Nebs.    - IVF stopped patient tolerating regular diet.       Chronic tobacco dependence   -Admits to smoking 1.5 PPD   -Continue Nicotine patch   -Continue to encourage long term cessation       Obesity with BMI 31.62 class 1   -------------------------------------------------   Medications:   pepcid po 20mg bid; humalog sq 0-3u qn; humalog 0-6u sq tid ac; levaquin iv 750mg q24h; lopressor po 25mg bid;  prednisone po 40mg qd; dilaudid iv 0.5mg x2;  zofran odt 4mg x1;    -------------------------------------------------   Orders:   tele;         Gastrointestinal Bleeding, Lower - Care Day 5 (3/28/2022) by Antwan Pérez RN       Review Status Review Entered   Completed 3/29/2022 15:15      Criteria Review      Care Day: 5 Care Date: 3/28/2022 Level of Care: Telemetry    Guideline Day 2    Clinical Status    (X) * Hypotension absent    3/29/2022 3:15 PM EDT by Angelica Singh      Vitals:  99 (37.2) 19 81 168/81  92 2l NC    ( ) * Bleeding absent or reduced    Activity    (X) Activity as tolerated    3/29/2022 3:15 PM EDT by Angelica Singh      UAT;    Routes    (X) * Oral hydration tolerated    (X) * Oral diet tolerated    3/29/2022 3:15 PM EDT by Angelica Singh      regular; Interventions    (X) Hgb/Hct    3/29/2022 3:15 PM EDT by Angelica Singh      Hemoglobin Quant: 11.6 (L)  Hematocrit: 36.5 (L)    * Milestone   Additional Notes   DATE: 3/28/22         Relevant baselines: (lab values, vitals, o2 amount/delivery, etc.)   ra   -------------------------------------------------   Abnl/Pertinent Labs/Radiology/Diagnostic Studies:   3/28/2022 01:31   Sodium: 139   Potassium: 3.5   Chloride: 97 (L)   CO2: 33 (H)   BUN,BUNPL: 13   Creatinine: 0.2 (L)   Anion Gap: 9   GLUCOSE, FASTING,GF: 112 (H)   CALCIUM, SERUM, [id number]: 9.2   Total Protein: 5.6 (L)   Albumin: 3.1 (L)   Alk Phos: 83   ALT: 10   AST: 17   Bilirubin: 0.4   WBC: 17.3 (H)   RBC: 4.24   Platelet Count: 833      3/28/2022 12:09   Prothrombin Time: 15.4 (H)   INR: 1.19   aPTT: 35.8      IR BIOPSY LIVER-   Impression   18 gauge core biopsy specimens hypoechoic mass adjacent to the gallbladder   fossa.  Specimen sent for laboratory evaluation on slides and in formalin   solution.  No complication suggested.       Gallstones without sonographic evidence of acute cholecystitis as visualized. -------------------------------------------------   Physical Exam:   GEN    Awake female, sitting upright in bed. Appears given age. EYES   Pupils are equally round.  No scleral erythema, discharge, or conjunctivitis.    HENT  Mucous membranes are moist. Oral pharynx without exudates, no evidence of thrush. NECK  Supple, no apparent thyromegaly or masses. RESP  Clear to auscultation, no wheezes, rales or rhonchi.  Symmetric chest movement while on room air. CARDIO/VASC           S1/S2 auscultated. Regular rate without appreciable murmurs, rubs, or gallops. No JVD or carotid bruits. Peripheral pulses equal bilaterally and palpable. No peripheral edema. GI        Abdomen is soft without significant tenderness, masses, or guarding. Bowel sounds are normoactive. Rectal exam deferred.        No costovertebral angle tenderness. Normal appearing external genitalia. Davis catheter is not present. HEME/LYMPH            No palpable cervical lymphadenopathy and no hepatosplenomegaly. No petechiae or ecchymoses. MSK    No gross joint deformities. SKIN    Normal coloration, warm, dry. NEURO           Cranial nerves appear grossly intact, normal speech, no lateralizing weakness. PSYCH            Awake, alert, oriented x 4.  Affect appropriate.   -------------------------------------------------   MD Consults/Assessments & Plans:   IM [id number]-   Patient underwent CT guided biopsy of liver lesion today without complication.  Pathology pending.  Patient Complaing of feeling constipated.  Had a bowel movement with some improvement. Declined dilaudid for pain per nursing.   Not on stool softener.  States stools were not hard and came out with out much straining.  Agreeable to glycerin suppository PRN.  Advised to increase hydration and consider stool softner or supossitory.  Feels she needs to try again. Temo Dies re assess after bathroom. Assessment and Plan:   Price Ricci is a 73 y.o. female who presents with GI bleed       GI bleed, stable, resolved. -Patient complaining of melanotic stools on admission   -Hemoglobin stable   -Monitor H&H   -Type and screen ordered.    -GI & GS following.       Suspected primary colon cancer with metastasis   -CT abdomen/pelvis-metastatic disease likely related to suspected colon cancer involving the mid to distal sigmoid colon with adjacent spread into the left pelvis versus adenopathy.  Diffuse liver metastasis and diffuse peritoneal and omental and mesenteric metastasis. -Patient made aware of CT findings.   -Consult general surgery & Heme Onc   -Offered IV opioid medication for abdominal pain.  Patient declined. - Colonoscopy revealed no lesions for biopsy. [place] elevated @29.9     - IR liver biopsy pending results.     - Patient may require mediport.   -Palliative care consult to help patient decide care goals and symptom management.       SIRS   -Leukocytosis improving, WBC 14.8 today. -UA not suggestive of infection   -Chest x-ray not done.     -CT abdomen/pelvis-no acute abnormality of the lower chest noted. -Monitor with repeat CBC.       Hypokalemia resolved, Hypomagnesemia repleated   - monitor and replete PRN       Multinodular ttkudv-RQG-hwpxuk nodule   -As per the documentation-11/2017 patient's TSH was 0.0006 with hyperthyroidism.     -TSH < 0.010, fT4 1.73, fT3 2.4    - Ti-RADS 4 thyroid nodules, differ timing of FNA to Heme/onc & Endocrinology at this time. endocrinology & heme onc following       COPD not in exacerbation   -Not on home oxygen   -Does not use inhalers at home. - Patient had wheezing early in admission and required 3L NC.   - Currently on 2L   - Continue Oral steroid x 5 days. Stop 3/29/22   - Continue Albuterol & PRN Nebs. - IVF stopped patient tolerating regular diet.       Chronic tobacco dependence   -Admits to smoking 1.5 PPD   -Continue Nicotine patch   -Continue to encourage long term cessation       Obesity with BMI 31.62 class1   Encouraged lifestyle modification as tolerated      IM 0202-   Morphine and tramadol not helping with patient's pain relief.  Will DC and start low-dose Dilaudid.          GS-   Assessment:   Mulugeta Ann is a 73 y.o. female with blood per rectum, imaging concerning for carcinomatosis     Principal Problem:     GI bleed   Active Problems:     Polyp of sigmoid colon   Resolved Problems:     * No resolved hospital problems. *           Plan:   · Discussed findings and options with Sid Watson. Her colonoscopy did not reveal a lesion which could be biopsied, but her CEA is elevated. · Tentatively getting an IR biopsy of the liver today   · Will await results to determine if a mediport is needed. Can be done as an outpatient depending on plans. Will continue to follow   · Thank you for the consultation and the opportunity to care for Sid Watson         ENDOCRINE-   Assessment:       Patient Active Problem List:      GI bleed      Polyp of sigmoid colon      Thyroid dysfunction/hyperthyroidism/multinodular goiter of bilateral       Has liver metastasis?            Plan:       1. Reviewed POC blood glucose . Labs and X ray results    2. Reviewed Current Medicines    3. Patient had multinodular goiter bilaterally that nodules over 2 cm but needs to have thyroid biopsy done to be done at a later date   4. Thyroid function test pending   5. GI oncology are working up possible colonic cancer with metastasis   6. The patient's colonoscopy was negative but the data strongly suggestive of colonic cancer with liver metastasis   7. Will follow                HEMAT-   ASSESSMENT   Probable colon cancer with diffuse metastasis       RECOMMENDATION   Colonoscopy is non diagnostic.  Her CEA is elevated at 29.9. I requested CT guided biopsy of liver lesion and hopefully to be done today. If she will be discharged after biopsy, we can follow her as an out patient.        Ti-RADS 4 thyroid nodules - endocrinology is on the case. May need out patient FNA at a later date.        We will continue to follow the patient.  Thank you.      -------------------------------------------------   Medications:   fioricet po 2 tab x1; lopressor po 25mg bid; protonix iv 40mg qd; prednisone po 40mg qd; tylenol 650mg po x2; dilaudid iv 0.5mg x1; tramadol po 50mg x1;    -------------------------------------------------   Orders:    bedrest; tele;   -------------------------------------------------   PT/OT/SLP/CM Assessments or Notes:   DIEITITAN-   Nutrition Recommendations/Plan:    · Will send oral nutrition supplement at meal time, encourage to drink between meals    · Left nutrition education handouts at visit    · Monitor GI status, pain, PO intakes, and poc        Nutrition Assessment:  Advanced diet from clears to regular diet, receiving some oral nutrition supplements, consuming 50% of meals. C/o abd pain near liver. Pt states hydrating often. Had 9-10 episodes of loose stool yesterday per pt. States had a few firm BMs between. Pt underwent polyp removal s/p c scope and revealed diverticulosis without colon ca diagnostic. D/w pt high fiber nutrition therapy and how to eat for Diverticulosis vs. for diarrhea. Encourage pt to decrease sugar sweetened beverages, noted 3 pop bottles in room. Follow as high nutrition risk.

## 2022-03-30 NOTE — PLAN OF CARE
Problem: Nutrition  Goal: Optimal nutrition therapy  3/30/2022 1124 by Janice Coley LPN  Outcome: Ongoing  3/30/2022 1123 by Janice Coley LPN  Outcome: Ongoing  3/30/2022 0140 by Mina Goff LPN  Outcome: Ongoing

## 2022-03-30 NOTE — PROGRESS NOTES
V2.0  [facility] Hospitalist Progress Note      Name:  Guerline Rios /Age/Sex: 1955  (73 y.o. female)   MRN & CSN:  [id number] & [id number] Encounter Date/Time: 3/29/2022 8:34 PM EDT    [facility]  [facility] PCP: Bryson Zamora 112 Day: 7    Assessment and Plan:   Guerline Rios is a 73 y.o. female with presents with GI bleed     GI bleed, stable, resolved. -Patient complaining of melanotic stools on admission  -Hemoglobin stable  -Monitor H&H  -Type and screen ordered. -GI & GS following.     Suspected primary colon cancer with metastasis  -CT abdomen/pelvis-metastatic disease likely related to suspected colon cancer involving the mid to distal sigmoid colon with adjacent spread into the left pelvis versus adenopathy. Diffuse liver metastasis and diffuse peritoneal and omental and mesenteric metastasis. -Patient made aware of CT findings.  -Consult general surgery & Heme Onc  -Offered IV opioid medication for abdominal pain.  Patient declined. - Colonoscopy revealed no lesions for biopsy. CEA elevated @29.9    - IR liver biopsy pending results. - Patient may require mediport.  -Palliative care consult to help patient decide care goals and symptom management.   - Patient prefers to go home and follow up outpatient. She is agreeable to home health for PT/OT  -GS, Heme/Onc & Endo agreeable to outpatient follow up. - Mediport if needed can be placed outpatient  - Follow outpatient for iver biopsy results   - Follow outpatient with endocrinology for thyroid biopsy  - UA, Procal, repeat CXR, pending.  - UA pending    Acute respiratory failure  87% on ambulation, 90% a rest.  Qualify for home O2     SIRS  -Worsening Leukocytosis possibly secondary to metastatic cancer and recent steroid use. - Will monitor  -CT abdomen/pelvis-no acute abnormality of the lower chest noted.   - UA, Procal, repeat CXR, pending.     Hypokalemia resolved, Hypomagnesemia repleated  - monitor and replete PRN     Multinodular cefslx-LUN-nyuksr nodule  -As per the documentation-11/2017 patient's TSH was 0.0006 with hyperthyroidism.    -TSH < 0.010, fT4 1.73, fT3 2.4   - Ti-RADS 4 thyroid nodules, differ timing of FNA to Heme/onc & Endocrinology at this time. endocrinology & heme onc following     COPD not in exacerbation  -Not on home oxygen  -Does not use inhalers at home. - Patient had wheezing early in admission and required 3L NC.  - Currently on 2L  - Continue Oral steroid x 5 days. Stop 3/29/22  - Continue Albuterol & PRN Nebs. - IVF stopped patient tolerating regular diet.     Chronic tobacco dependence  -Admits to smoking 1.5 PPD  -Continue Nicotine patch  -Continue to encourage long term cessation     Obesity with BMI 31.62 class 1      Diet ADULT DIET; Regular  ADULT ORAL NUTRITION SUPPLEMENT; Breakfast, Lunch, Dinner;  Low Calorie/High Protein Oral Supplement   DVT Prophylaxis [] Lovenox, []  Heparin, [] SCDs, [] Ambulation,  [] Eliquis, [] Xarelto  [] Coumadin X high bleeding risk with recent GI bleed   GI Prophylaxis [] PPI,  [x] H2 Blocker,  [] Carafate,  [] Diet,  [] No GI prophylaxis, N/A: patient is not under significant medical stress, non-ICU or is receiving a diet/tube feeds   Code Status Full Code   Disposition From: Home  Expected Disposition: Patient prefers Home  Estimated Date of Discharge: 1- 2 days  Patient requires continued admission due to 3/30/22   Surrogate Decision Maker/ POA  Sister Suzy Ariza   [facility] [] Low, [] Moderate,[x]  High  Patient's risk as above due to:      [] One or more chronic illnesses with mild exacerbation progression      [] Two or more stable chronic illnesses      [x] Undiagnosed new problem with uncertain prognosis      [] Elective major surgery      []Prescription drug management       Subjective:   Chief Complaint:  Abdominal Pain (x6 wks) and Melena (x6wks)     Admitted for: GI bleed     Johanna Stringer is a 73 y.o. female who presents with GI bleed  Patient desaturating with ambulation. Will follow up repeat UA, Procal, repeat CXR, pending. Likely need home oxygen on discharge. Home oxygen eval pending. Patient feels generally weak and continues to have diffuse abdominal pain at times. Reports it is tolerable on tylenol at this time. Declines percocet. Agreeable to tramadol if she needs it. Denies other complaints. No acute interval events. Plan for DC in 1-2 days. Ten point ROS reviewed negative, unless as noted above      Objective:   No intake or output data in the 24 hours ending 03/29/22 2034   Vitals:   Vitals:    03/29/22 1955   BP: (!) 142/75   Pulse: 77   Resp: 14   Temp: 97.6 °F (36.4 °C)   SpO2: 94%     Physical Exam:     General: NAD  Eyes: EOMI  ENT: neck supple  Cardiovascular: Regular rate. Respiratory: Clear to auscultation  Gastrointestinal: Soft, non tender  Genitourinary: no suprapubic tenderness  Musculoskeletal: No edema  Skin: warm, dry  Neuro: Alert. Psych: Mood appropriate. Past Medical History:    History reviewed. No pertinent past medical history. PSHX:  has a past surgical history that includes Tonsillectomy; Tubal ligation; Dilation and curettage of uterus; Colonoscopy (N/A, 3/26/2022); Colonoscopy (N/A, 3/26/2022); and IR BIOPSY LIVER PERCUTANEOUS (3/28/2022). Allergies: No Known Allergies    FAM HX: family history is not on file.   Soc HX:   Social History     Socioeconomic History    Marital status:      Spouse name: None    Number of children: None    Years of education: None    Highest education level: None   Occupational History    None   Tobacco Use    Smoking status: Former Smoker    Smokeless tobacco: Current User   Substance and Sexual Activity    Alcohol use: No    Drug use: No    Sexual activity: None   Other Topics Concern    None   Social History Narrative    None     Social Determinants of Health     Financial Resource Strain:     Difficulty of Paying Living Expenses: Not on file   Food Insecurity:     Worried About [facility] in the Last Year: Not on file    Kristy of Food in the Last Year: Not on file   Transportation Needs:     Lack of Transportation (Medical): Not on file    Lack of Transportation (Non-Medical):  Not on file   Physical Activity:     Days of Exercise per Week: Not on file    Minutes of Exercise per Session: Not on file   Stress:     Feeling of Stress : Not on file   Social Connections:     Frequency of Communication with Friends and Family: Not on file    Frequency of Social Gatherings with Friends and Family: Not on file    Attends Yarsani Services: Not on file    Active Member of Clubs or Organizations: Not on file    Attends Club or Organization Meetings: Not on file    Marital Status: Not on file   Intimate Partner Violence:     Fear of Current or Ex-Partner: Not on file    Emotionally Abused: Not on file    Physically Abused: Not on file    Sexually Abused: Not on file   Housing Stability:     Unable to Pay for Housing in the Last Year: Not on file    Number of Places Lived in the Last Year: Not on file    Unstable Housing in the Last Year: Not on file       Medications:   Medications:    azelastine  1 spray Each Nostril BID    metoprolol tartrate  25 mg Oral BID    predniSONE  40 mg Oral Daily    insulin lispro  0-6 Units SubCUTAneous TID WC    insulin lispro  0-3 Units SubCUTAneous Nightly    sodium chloride flush  5-40 mL IntraVENous 2 times per day    nicotine  1 patch TransDERmal Daily      Infusions:    sodium chloride       PRN Meds: glycerin (ADULT), 1 suppository, Daily PRN  HYDROmorphone, 0.5 mg, Q4H PRN  ipratropium-albuterol, 1 ampule, Q4H PRN  sodium chloride flush, 5-40 mL, PRN  sodium chloride, 25 mL, PRN  ondansetron, 4 mg, Q8H PRN   Or  ondansetron, 4 mg, Q6H PRN  acetaminophen, 650 mg, Q6H PRN   Or  acetaminophen, 650 mg, Q6H PRN  albuterol sulfate HFA, 2 puff, Q6H PRN          Labs      Recent Results Collection Time: 03/29/22 11:23 AM   Result Value Ref Range    POC Glucose 111 (H) 70 - 99 MG/DL   POCT Glucose    Collection Time: 03/29/22  5:14 PM   Result Value Ref Range    POC Glucose 161 (H) 70 - 99 MG/DL   POCT Glucose    Collection Time: 03/29/22  8:00 PM   Result Value Ref Range    POC Glucose 132 (H) 70 - 99 MG/DL   POCT Glucose    Collection Time: 03/29/22  8:11 PM   Result Value Ref Range    POC Glucose 133 (H) 70 - 99 MG/DL        Imaging/Diagnostics Last 24 Hours   XR CHEST 1 VIEW    Result Date: 3/29/2022  EXAMINATION: ONE XRAY VIEW OF THE CHEST 3/29/2022 3:44 pm COMPARISON: 03/26/2022 HISTORY: ORDERING SYSTEM PROVIDED HISTORY: SOB TECHNOLOGIST PROVIDED HISTORY: Reason for exam:->SOB Reason for Exam: SOB FINDINGS: The lungs are without acute focal process. There is no effusion or pneumothorax. The cardiomediastinal silhouette is without acute process. The osseous structures are without acute process. No acute process. IR BIOPSY LIVER PERCUTANEOUS    Result Date: 3/29/2022  PROCEDURE: IR BIOPSY LIVER PERCUTANEOUS 3/29/2022 HISTORY: ORDERING SYSTEM PROVIDED HISTORY: mass TECHNOLOGIST PROVIDED HISTORY: Reason for exam:->mass TECHNIQUE: Maximum sterile barrier technique including hand hygiene, skin prep and sterile ultrasound technique utilized for procedure. Sterile ultrasound technique also utilized for procedure Following informed consent, pause a confirm/time-out and ultrasound-guided puncture site selection, skin and ultrasound probe were prepped draped in sterile fashion. 10 mL 1% lidocaine without epinephrine for local anesthesia. Ultrasound-guided access of subcapsular hypoechoic mass adjacent to the gallbladder fossa was achieved. 25 gauge core biopsy specimens collected and sent for laboratory evaluation on slides and in formalin solution. Access needle removed. Postprocedure images made. Dressing applied. Patient tolerated procedure well.  CONTRAST: None SEDATION: None FLUOROSCOPY DOSE AND TYPE OR TIME AND EXPOSURES: Ultrasound guidance/none DESCRIPTION OF PROCEDURE: Informed consent was obtained after a detailed explanation of the procedure including risks, benefits, and alternatives. Universal protocol was observed. As above in technique section FINDINGS: Pre and intraprocedural images demonstrate hypoechoic mass about the gallbladder fossa similar to abdomen pelvis CT 03/23/2022. Biopsy needle seen within target lesion. No complication suggested. Incidentally visualized portions of the gallbladder demonstrate echogenic foci reflecting stones. No gallbladder wall thickening or pericholecystic fluid. Hepatobiliary scan suggested if clinical concern of acute cholecystitis. 25 gauge core biopsy specimens hypoechoic mass adjacent to the gallbladder fossa. Specimen sent for laboratory evaluation on slides and in formalin solution. No complication suggested. Gallstones without sonographic evidence of acute cholecystitis as visualized.          Electronically signed by Ryan Ortega DO on 3/29/2022 at 8:34 PM

## 2022-03-30 NOTE — PROGRESS NOTES
V2.0  [facility] Hospitalist Progress Note      Name:  Lashay Gupta /Age/Sex: 1955  (73 y.o. female)   MRN & CSN:  [id number] & [id number] Encounter Date/Time: 3/30/2022 8:34 PM EDT    Location:  [address] [place] [address]-A PCP: Bryson Ta Day: 8    Assessment and Plan:   Lashay Gupta is a 73 y.o. female with presents with GI bleed     GI bleed, stable, resolved. -Patient complaining of melanotic stools on admission  -Hemoglobin stable  -Monitor H&H  -Type and screen ordered. -GI & GS following.     Suspected primary colon cancer with metastasis  -CT abdomen/pelvis-metastatic disease likely related to suspected colon cancer involving the mid to distal sigmoid colon with adjacent spread into the left pelvis versus adenopathy. Diffuse liver metastasis and diffuse peritoneal and omental and mesenteric metastasis. -Patient made aware of CT findings.  -Consult general surgery & Heme Onc  -Offered IV opioid medication for abdominal pain.  Patient declined. - Colonoscopy revealed no lesions for biopsy. CEA elevated @29.9    - IR liver biopsy pending results. - Patient may require mediport.  -Palliative care consult to help patient decide care goals and symptom management.   - Patient prefers to go home and follow up outpatient. She is agreeable to home health for PT/OT  -GS, Heme/Onc & Endo agreeable to outpatient follow up. - Mediport if needed can be placed outpatient  - Follow outpatient for iver biopsy results   - Follow outpatient with endocrinology for thyroid biopsy  - UA, Procal, repeat CXR, pending.  - UA pending    Acute respiratory failure  Patient was seen in hospital for COPD. I am prescribing oxygen because the diagnosis and testing requires the patient to have oxygen in the home. Conditions will improve or be benefited by oxygen use.   The patient is able to perform good mobility and therefore requires the use of a portable oxygen system for ambulation.       SIRS, Possible HAP PNA. - Worsening Leukocytosis possibly secondary to metastatic cancer and recent steroid use. - Will monitor  -CT abdomen/pelvis-no acute abnormality of the lower chest noted. -UA not suggestive of infection, Procal 1.82, Repeat CXR clear, Pt afebrile.   -Chest x-ray Left lower lobe airspace opacity could represent atelectasis or pneumonia 3/26  - No CXR on admission to compare possible hospital associated pneumonia. -Given immunosenecent state with active cancer. Will cover for potential PNA. -Start Levaquin for empiric coverage.  -Monitor with repeat CBC. Hypokalemia resolved, Hypomagnesemia repleated  - monitor and replete PRN     Multinodular fadgsp-ESI-[id number] nodule  -As per the documentation-11/2017 patient's TSH was 0.0006 with hyperthyroidism.    -TSH < 0.010, fT4 1.73, fT3 2.4   - Ti-RADS 4 thyroid nodules, differ timing of FNA to Heme/onc & Endocrinology at this time. endocrinology & heme onc following     COPD not in exacerbation  -Not on home oxygen  -Does not use inhalers at home. - Patient had wheezing early in admission and required 3L NC.  - Currently on 2L  - Continue Oral steroid x 5 days. Stop 3/29/22  - Continue Albuterol & PRN Nebs. - IVF stopped patient tolerating regular diet.     Chronic tobacco dependence  -Admits to smoking 1.5 PPD  -Continue Nicotine patch  -Continue to encourage long term cessation     Obesity with BMI 31.62 class 1      Diet ADULT DIET; Regular  ADULT ORAL NUTRITION SUPPLEMENT; Breakfast, Lunch, Dinner;  Low Calorie/High Protein Oral Supplement   DVT Prophylaxis [] Lovenox, []  Heparin, [] SCDs, [] Ambulation,  [] Eliquis, [] Xarelto  [] Coumadin X high bleeding risk with recent GI bleed   GI Prophylaxis [] PPI,  [x] H2 Blocker,  [] Carafate,  [] Diet,  [] No GI prophylaxis, N/A: patient is not under significant medical stress, non-ICU or is receiving a diet/tube feeds   Code Status Full Code   Disposition From: Home  Expected Disposition: Patient prefers Home  Estimated Date of Discharge: 1- 2 days  Patient requires continued admission due to 3/30/22   Surrogate Decision Maker/ POA  Sister Tete Munoz   [facility] [] Low, [] Moderate,[x]  High  Patient's risk as above due to:      [] One or more chronic illnesses with mild exacerbation progression      [] Two or more stable chronic illnesses      [x] Undiagnosed new problem with uncertain prognosis      [] Elective major surgery      []Prescription drug management       Subjective:   Chief Complaint:  Abdominal Pain (x6 wks) and Melena (x6wks)     Admitted for: GI bleed     Mulugeta Ann is a 73 y.o. female who presents with GI bleed  Home oxygen being arranged. Levaquin started for PNA. Patient did require dilaudid this morning. Has being doing well on tylenol. Plan for discharge tomorrow if pain under control and Home oxygen arranged. Ten point ROS reviewed negative, unless as noted above      Objective:   No intake or output data in the 24 hours ending 03/30/22 1641   Vitals:   Vitals:    03/30/22 1453   BP: 122/69   Pulse: 79   Resp: 16   Temp: 98 °F (36.7 °C)   SpO2: 95%     Physical Exam:     General: NAD  Eyes: EOMI  ENT: neck supple  Cardiovascular: Regular rate. Respiratory: Clear to auscultation  Gastrointestinal: Soft, non tender  Genitourinary: no suprapubic tenderness  Musculoskeletal: No edema  Skin: warm, dry  Neuro: Alert. Psych: Mood appropriate. Past Medical History:    History reviewed. No pertinent past medical history. PSHX:  has a past surgical history that includes Tonsillectomy; Tubal ligation; Dilation and curettage of uterus; Colonoscopy (N/A, 3/26/2022); Colonoscopy (N/A, 3/26/2022); and IR BIOPSY LIVER PERCUTANEOUS (3/28/2022). Allergies: No Known Allergies    FAM HX: family history is not on file.   Soc HX:   Social History     Socioeconomic History    Marital status:      Spouse name: None    Number of children: None    Years of education: None    Highest education level: None   Occupational History    None   Tobacco Use    Smoking status: Former Smoker    Smokeless tobacco: Current User   Substance and Sexual Activity    Alcohol use: No    Drug use: No    Sexual activity: None   Other Topics Concern    None   Social History Narrative    None     Social Determinants of Health     Financial Resource Strain:     Difficulty of Paying Living Expenses: Not on file   Food Insecurity:     Worried About Running Out of Food in the Last Year: Not on file    Kristy of Food in the Last Year: Not on file   Transportation Needs:     Lack of Transportation (Medical): Not on file    Lack of Transportation (Non-Medical):  Not on file   Physical Activity:     Days of Exercise per Week: Not on file    Minutes of Exercise per Session: Not on file   Stress:     Feeling of Stress : Not on file   Social Connections:     Frequency of Communication with Friends and Family: Not on file    Frequency of Social Gatherings with Friends and Family: Not on file    Attends Judaism Services: Not on file    Active Member of [address] [place] [address] or Organizations: Not on file    Attends Club or Organization Meetings: Not on file    Marital Status: Not on file   Intimate Partner Violence:     Fear of Current or Ex-Partner: Not on file    Emotionally Abused: Not on file    Physically Abused: Not on file    Sexually Abused: Not on file   Housing Stability:     Unable to Pay for Housing in the Last Year: Not on file    Number of Places Lived in the Last Year: Not on file    Unstable Housing in the Last Year: Not on file       Medications:   Medications:    levofloxacin  750 mg IntraVENous Q24H    famotidine  20 mg Oral BID    azelastine  1 spray Each Nostril BID    metoprolol tartrate  25 mg Oral BID    predniSONE  40 mg Oral Daily    insulin lispro  0-6 Units SubCUTAneous TID WC    insulin lispro  0-3 Units SubCUTAneous Nightly    sodium chloride flush  5-40 mL IntraVENous 2 times per day    nicotine  1 patch TransDERmal Daily      Infusions:    sodium chloride       PRN Meds: oxyCODONE-acetaminophen, 1 tablet, Q4H PRN  acetaminophen, 500 mg, Q6H PRN   Or  acetaminophen, 325 mg, Q6H PRN  glycerin (ADULT), 1 suppository, Daily PRN  ipratropium-albuterol, 1 ampule, Q4H PRN  sodium chloride flush, 5-40 mL, PRN  sodium chloride, 25 mL, PRN  ondansetron, 4 mg, Q8H PRN   Or  ondansetron, 4 mg, Q6H PRN  albuterol sulfate HFA, 2 puff, Q6H PRN          Labs      Recent Results (from the past 24 hour(s))   POCT Glucose    Collection Time: 03/29/22  5:14 PM   Result Value Ref Range    POC Glucose 161 (H) 70 - 99 MG/DL   POCT Glucose    Collection Time: 03/29/22  8:00 PM   Result Value Ref Range    POC Glucose 132 (H) 70 - 99 MG/DL   POCT Glucose    Collection Time: 03/29/22  8:11 PM   Result Value Ref Range    POC Glucose 133 (H) 70 - 99 MG/DL   Urinalysis with Reflex to Culture    Collection Time: 03/30/22 12:08 AM    Specimen: Urine   Result Value Ref Range    Color, UA YELLOW YELLOW    Clarity, UA CLEAR CLEAR    Glucose, Urine NEGATIVE NEGATIVE MG/DL    Bilirubin Urine NEGATIVE NEGATIVE MG/DL    Ketones, Urine 15 (A) NEGATIVE MG/DL    Specific Gravity, UA 1.025 1.001 - 1.035    Blood, Urine TRACE (A) NEGATIVE    pH, Urine 6.5 5.0 - 8.0    Protein, UA TRACE (A) NEGATIVE MG/DL    Urobilinogen, Urine NORMAL 0.2 - 1.0 MG/DL    Nitrite Urine, Quantitative NEGATIVE NEGATIVE    Leukocyte Esterase, Urine NEGATIVE NEGATIVE    RBC, UA 1 0 - 6 /HPF    WBC, UA <1 0 - 5 /HPF    Bacteria, UA NEGATIVE NEGATIVE /HPF    Squam Epithel, UA <1 /HPF    Mucus, UA RARE (A) NEGATIVE HPF   POCT Glucose    Collection Time: 03/30/22  6:00 AM   Result Value Ref Range    POC Glucose 100 (H) 70 - 99 MG/DL   CBC with Auto Differential    Collection Time: 03/30/22  6:05 AM   Result Value Ref Range    WBC 16.1 (H) 4.0 - 10.5 K/CU MM    RBC 4.65 4.2 - 5.4 M/CU MM    Hemoglobin 12.8 12.5 - 16.0 GM/DL    Hematocrit 39.2 37 - 47 % MCV 84.3 78 - 100 FL    MCH 27.5 27 - 31 PG    MCHC 32.7 32.0 - 36.0 %    RDW 12.9 11.7 - 14.9 %    Platelets 890 (H) 088 - 440 K/CU MM    MPV 10.7 7.5 - 11.1 FL    Differential Type AUTOMATED DIFFERENTIAL     Segs Relative 78.9 (H) 36 - 66 %    Lymphocytes % 10.1 (L) 24 - 44 %    Monocytes % 9.6 (H) 0 - 4 %    Eosinophils % 0.4 0 - 3 %    Basophils % 0.1 0 - 1 %    Segs Absolute 12.7 K/CU MM    Lymphocytes Absolute 1.6 K/CU MM    Monocytes Absolute 1.5 K/CU MM    Eosinophils Absolute 0.1 K/CU MM    Basophils Absolute 0.0 K/CU MM    Nucleated RBC % 0.0 %    Total Nucleated RBC 0.0 K/CU MM    Total Immature Neutrophil 0.14 K/CU MM    Immature Neutrophil % 0.9 (H) 0 - 0.43 %   Comprehensive Metabolic Panel    Collection Time: 03/30/22  6:05 AM   Result Value Ref Range    Sodium 139 135 - 145 MMOL/L    Potassium 3.6 3.5 - 5.1 MMOL/L    Chloride 93 (L) 99 - 110 mMol/L    CO2 31 21 - 32 MMOL/L    BUN 12 6 - 23 MG/DL    CREATININE 0.2 (L) 0.6 - 1.1 MG/DL    Glucose 87 70 - 99 MG/DL    Calcium 9.3 8.3 - 10.6 MG/DL    Albumin 3.0 (L) 3.4 - 5.0 GM/DL    Total Protein 5.9 (L) 6.4 - 8.2 GM/DL    Total Bilirubin 0.5 0.0 - 1.0 MG/DL    ALT 17 10 - 40 U/L    AST 25 15 - 37 IU/L    Alkaline Phosphatase 103 40 - 129 IU/L    GFR Non-African American >60 >60 mL/min/1.73m2    GFR African American >60 >60 mL/min/1.73m2    Anion Gap 15 4 - 16   Magnesium    Collection Time: 03/30/22  6:05 AM   Result Value Ref Range    Magnesium 1.8 1.8 - 2.4 mg/dl   Procalcitonin    Collection Time: 03/30/22  6:05 AM   Result Value Ref Range    Procalcitonin 1.06    POCT Glucose    Collection Time: 03/30/22  8:18 AM   Result Value Ref Range    POC Glucose 95 70 - 99 MG/DL   POCT Glucose    Collection Time: 03/30/22 11:47 AM   Result Value Ref Range    POC Glucose 127 (H) 70 - 99 MG/DL        Imaging/Diagnostics Last 24 Hours   XR CHEST 1 VIEW    Result Date: 3/29/2022  EXAMINATION: ONE XRAY VIEW OF THE CHEST 3/29/2022 3:44 pm COMPARISON: 03/26/2022 HISTORY: ORDERING SYSTEM PROVIDED HISTORY: SOB TECHNOLOGIST PROVIDED HISTORY: Reason for exam:->SOB Reason for Exam: SOB FINDINGS: The lungs are without acute focal process. There is no effusion or pneumothorax. The cardiomediastinal silhouette is without acute process. The osseous structures are without acute process. No acute process. IR BIOPSY LIVER PERCUTANEOUS    Result Date: 3/29/2022  PROCEDURE: IR BIOPSY LIVER PERCUTANEOUS 3/29/2022 HISTORY: ORDERING SYSTEM PROVIDED HISTORY: mass TECHNOLOGIST PROVIDED HISTORY: Reason for exam:->mass TECHNIQUE: Maximum sterile barrier technique including hand hygiene, skin prep and sterile ultrasound technique utilized for procedure. Sterile ultrasound technique also utilized for procedure Following informed consent, pause a confirm/time-out and ultrasound-guided puncture site selection, skin and ultrasound probe were prepped draped in sterile fashion. 10 mL 1% lidocaine without epinephrine for local anesthesia. Ultrasound-guided access of subcapsular hypoechoic mass adjacent to the gallbladder fossa was achieved. 25 gauge core biopsy specimens collected and sent for laboratory evaluation on slides and in formalin solution. Access needle removed. Postprocedure images made. Dressing applied. Patient tolerated procedure well. CONTRAST: None SEDATION: None FLUOROSCOPY DOSE AND TYPE OR TIME AND EXPOSURES: Ultrasound guidance/none DESCRIPTION OF PROCEDURE: Informed consent was obtained after a detailed explanation of the procedure including risks, benefits, and alternatives. Universal protocol was observed. As above in technique section FINDINGS: Pre and intraprocedural images demonstrate hypoechoic mass about the gallbladder fossa similar to abdomen pelvis CT 03/23/2022. Biopsy needle seen within target lesion. No complication suggested. Incidentally visualized portions of the gallbladder demonstrate echogenic foci reflecting stones.   No gallbladder wall thickening or pericholecystic fluid. Hepatobiliary scan suggested if clinical concern of acute cholecystitis. 25 gauge core biopsy specimens hypoechoic mass adjacent to the gallbladder fossa. Specimen sent for laboratory evaluation on slides and in formalin solution. No complication suggested. Gallstones without sonographic evidence of acute cholecystitis as visualized.          Electronically signed by Katrina Cuevas DO on 3/30/2022 at 4:41 PM

## 2022-03-30 NOTE — PROGRESS NOTES
Progress Note( Dr. Dylan Haywood)  3/29/2022  Subjective:   Admit Date: 3/23/2022  PCP: CANDELARIA Valera CNP    Admitted For : abdominal pain// GI bleeding    Consulted For: Possible thyroid dysfunction with elevated serum free T4    Interval History: Is being investigated for possible colorectal tumor with metastasis to the liver. Patient had colonoscopy  There is no mass noted except some colonic polyps that were biopsied  The thyroid function test ordered but not available  Also had thyroid ultrasound done which is results described below    For the GI malignancy status awaiting input from oncology and general surgery  Patient had liver biopsy done yesterday on 3/28/2022    Denies any chest pains,   Denies SOB . Denies nausea or vomiting. No new bowel or bladder symptoms. No intake or output data in the 24 hours ending 03/29/22 2222    DATA    CBC:   Recent Labs     03/27/22  1425 03/28/22  0131 03/29/22  0705   WBC 17.2* 17.3* 14.8*   HGB 12.3* 11.6* 13.1    320 414    CMP:  Recent Labs     03/27/22  1425 03/28/22  0131 03/29/22  0705    139 140   K 3.6 3.5 3.6   CL 95* 97* 93*   CO2 31 33* 33*   BUN 12 13 11   CREATININE 0.3* 0.2* 0.2*   CALCIUM 8.9 9.2 9.4   PROT 5.5* 5.6* 6.2*   LABALBU 3.0* 3.1* 3.2*   BILITOT 0.4 0.4 0.6   ALKPHOS 83 83 94   AST 18 17 24   ALT 10 10 13     Lipids: No results found for: CHOL, HDL, TRIG  Glucose:  Recent Labs     03/29/22  1714 03/29/22 2000 03/29/22 2011   POCGLU 161* 132* 133*     [id number]:No results found for: LABA1C  High Sensitivity TSH:   Lab Results   Component Value Date    TSHHS <0.010 03/27/2022     Free T3:   Lab Results   Component Value Date    FT3 2.4 03/27/2022     Free T4:  Lab Results   Component Value Date    T4FREE 1.73 03/27/2022       XR CHEST 1 VIEW   Final Result   No acute process. IR BIOPSY LIVER PERCUTANEOUS   Final Result   18 gauge core biopsy specimens hypoechoic mass adjacent to the gallbladder   fossa. Specimen sent for laboratory evaluation on slides and in formalin   solution. No complication suggested. Gallstones without sonographic evidence of acute cholecystitis as visualized. US HEAD NECK SOFT TISSUE THYROID   Final Result   Multinodular goiter with bilateral 2.3 cm TI-RADS 4 nodules measuring 2.3 cm. These are moderately suspicious. FNA is recommended given size greater than   1.5 cm. XR CHEST PORTABLE   Final Result   Left lower lobe airspace opacity could represent atelectasis or pneumonia         CT ABDOMEN PELVIS W IV CONTRAST Additional Contrast? None   Final Result      1. Note is made of diffuse liver metastasis and diffuse peritoneal and   omental and mesenteric metastasis as well as possibly some upper   retroperitoneal metastatic adenopathy. Though not definitive, this likely is   related to a suspected colon carcinoma involving the mid to distal sigmoid   colon with adjacent spread into the left pelvis versus adenopathy. 2.  Small amount of fluid noted within the pelvis. 3.  Somewhat distended gallbladder thought to contain gallstones, 1 of which   may be within the gallbladder neck versus mural calcification.               Scheduled Medicines   Medications:    azelastine  1 spray Each Nostril BID    metoprolol tartrate  25 mg Oral BID    predniSONE  40 mg Oral Daily    insulin lispro  0-6 Units SubCUTAneous TID WC    insulin lispro  0-3 Units SubCUTAneous Nightly    sodium chloride flush  5-40 mL IntraVENous 2 times per day    nicotine  1 patch TransDERmal Daily      Infusions:    sodium chloride           Objective:   Vitals: BP (!) 142/75   Pulse 77   Temp 97.6 °F (36.4 °C) (Oral)   Resp 14   Ht 5' 5\" (1.651 m)   Wt 195 lb (88.5 kg)   SpO2 94%   BMI 32.45 kg/m²   General appearance: alert and cooperative with exam  Neck: no JVD or bruit  Thyroid multinodular goiter  Lungs: Has Vesicular Breath sounds   Heart:  regular rate and rhythm  Abdomen: soft, non-tender; bowel sounds normal; no masses,  no organomegaly  Musculoskeletal: Normal  Extremities: extremities normal, , no edema  Neurologic:  Awake, alert, oriented to name, place and time. Cranial nerves II-XII are grossly intact. Motor is  intact. Sensory is intact. ,  and gait is normal.    Assessment:     Patient Active Problem List:     GI bleed     Polyp of sigmoid colon     Thyroid dysfunction/hyperthyroidism/multinodular goiter of bilateral      Has liver metastasis? Plan:     1. Reviewed POC blood glucose . Labs and X ray results   2. Reviewed Current Medicines   3. Patient had multinodular goiter bilaterally that nodules over 2 cm but needs to have thyroid biopsy done to be done at a later date  4. Thyroid function test pending  5. GI oncology are working up possible colonic cancer with metastasis  6. The patient's colonoscopy was negative but the data strongly suggestive of colonic cancer with liver metastasis  7. Waiting for the results of liver biopsy  8. Will follow     .      Dolores Maddox MD, MD

## 2022-03-31 PROBLEM — E44.0 MODERATE MALNUTRITION (HCC): Status: ACTIVE | Noted: 2022-01-01

## 2022-03-31 NOTE — PLAN OF CARE
Problem: Nutrition  Goal: Optimal nutrition therapy  3/31/2022 1007 by Sue Crouch LPN  Outcome: Ongoing  3/31/2022 0026 by Emilie Hoffman RN  Outcome: Ongoing

## 2022-03-31 NOTE — PROGRESS NOTES
Progress Note( Dr. Kassidy Quezada)  3/30/2022  Subjective:   Admit Date: 3/23/2022  PCP: CANDELARIA Zamora CNP    Admitted For : abdominal pain// GI bleeding    Consulted For: Possible thyroid dysfunction with elevated serum free T4    Interval History: Is being investigated for possible colorectal tumor with metastasis to the liver. Patient had colonoscopy  There is no mass noted except some colonic polyps that were biopsied  The thyroid function test ordered but not available  Also had thyroid ultrasound done which is results described below    For the GI malignancy status awaiting input from oncology and general surgery  Patient had liver biopsy done yesterday on 3/28/2022    Denies any chest pains,   Denies SOB . Denies nausea or vomiting. No new bowel or bladder symptoms. Intake/Output Summary (Last 24 hours) at 3/30/2022 2246  Last data filed at 3/30/2022 1748  Gross per 24 hour   Intake 400 ml   Output --   Net 400 ml       DATA    CBC:   Recent Labs     03/28/22  0131 03/29/22  0705 03/30/22  0605   WBC 17.3* 14.8* 16.1*   HGB 11.6* 13.1 12.8    414 441*    CMP:  Recent Labs     03/28/22  0131 03/29/22  0705 03/30/22  0605    140 139   K 3.5 3.6 3.6   CL 97* 93* 93*   CO2 33* 33* 31   BUN 13 11 12   CREATININE 0.2* 0.2* 0.2*   CALCIUM 9.2 9.4 9.3   PROT 5.6* 6.2* 5.9*   LABALBU 3.1* 3.2* 3.0*   BILITOT 0.4 0.6 0.5   ALKPHOS 83 94 103   AST 17 24 25   ALT 10 13 17     Lipids: No results found for: CHOL, HDL, TRIG  Glucose:  Recent Labs     03/30/22  1147 03/30/22  1701 03/30/22  2153   POCGLU 127* 136* 127*     [id number]:No results found for: LABA1C  High Sensitivity TSH:   Lab Results   Component Value Date    TSHHS <0.010 03/27/2022     Free T3:   Lab Results   Component Value Date    FT3 2.4 03/27/2022     Free T4:  Lab Results   Component Value Date    T4FREE 1.73 03/27/2022       XR CHEST 1 VIEW   Final Result   No acute process.          IR BIOPSY LIVER PERCUTANEOUS   Final Result   18 gauge core biopsy specimens hypoechoic mass adjacent to the gallbladder   fossa. Specimen sent for laboratory evaluation on slides and in formalin   solution. No complication suggested. Gallstones without sonographic evidence of acute cholecystitis as visualized. US HEAD NECK SOFT TISSUE THYROID   Final Result   Multinodular goiter with bilateral 2.3 cm TI-RADS 4 nodules measuring 2.3 cm. These are moderately suspicious. FNA is recommended given size greater than   1.5 cm. XR CHEST PORTABLE   Final Result   Left lower lobe airspace opacity could represent atelectasis or pneumonia         CT ABDOMEN PELVIS W IV CONTRAST Additional Contrast? None   Final Result      1. Note is made of diffuse liver metastasis and diffuse peritoneal and   omental and mesenteric metastasis as well as possibly some upper   retroperitoneal metastatic adenopathy. Though not definitive, this likely is   related to a suspected colon carcinoma involving the mid to distal sigmoid   colon with adjacent spread into the left pelvis versus adenopathy. 2.  Small amount of fluid noted within the pelvis. 3.  Somewhat distended gallbladder thought to contain gallstones, 1 of which   may be within the gallbladder neck versus mural calcification.               Scheduled Medicines   Medications:    levofloxacin  750 mg IntraVENous Q24H    famotidine  20 mg Oral BID    azelastine  1 spray Each Nostril BID    metoprolol tartrate  25 mg Oral BID    predniSONE  40 mg Oral Daily    insulin lispro  0-6 Units SubCUTAneous TID WC    insulin lispro  0-3 Units SubCUTAneous Nightly    sodium chloride flush  5-40 mL IntraVENous 2 times per day    nicotine  1 patch TransDERmal Daily      Infusions:    sodium chloride           Objective:   Vitals: /78   Pulse 80   Temp 97.9 °F (36.6 °C) (Oral)   Resp 18   Ht 5' 5\" (1.651 m)   Wt 195 lb (88.5 kg)   SpO2 96%   BMI 32.45 kg/m²   General appearance: alert and cooperative with exam  Neck: no JVD or bruit  Thyroid multinodular goiter  Lungs: Has Vesicular Breath sounds   Heart:  regular rate and rhythm  Abdomen: soft, non-tender; bowel sounds normal; no masses,  no organomegaly  Musculoskeletal: Normal  Extremities: extremities normal, , no edema  Neurologic:  Awake, alert, oriented to name, place and time. Cranial nerves II-XII are grossly intact. Motor is  intact. Sensory is intact. ,  and gait is normal.    Assessment:     Patient Active Problem List:     GI bleed     Polyp of sigmoid colon     Thyroid dysfunction/hyperthyroidism/multinodular goiter of bilateral      Has liver metastasis? Plan:     1. Reviewed POC blood glucose . Labs and X ray results   2. Reviewed Current Medicines   3. Patient had multinodular goiter bilaterally that nodules over 2 cm but needs to have thyroid biopsy done to be done at a later date  4. Thyroid function test pending  5. GI oncology are working up possible colonic cancer with metastasis  6. The patient's colonoscopy was negative but the data strongly suggestive of colonic cancer with liver metastasis  7. Waiting for the results of liver biopsy  8. Will follow     .      Trino Murray MD, MD

## 2022-03-31 NOTE — PROGRESS NOTES
Home Oxygen Evaluation is complete and has been faxed to [facility]. Please call the Pulmonary Lab or Respiratory Care when the pt is being discharged for the delivery of an IGO (oxygen concentrator). Do NOT let the pt leave without an IGO.

## 2022-03-31 NOTE — PROGRESS NOTES
Comprehensive Nutrition Assessment    Type and Reason for Visit:  Reassess    Nutrition Recommendations/Plan:   · Modify oral nutrition supplements to Standard High Calorie, High Protein TID   · Consider appetite stimulant if medically able   · If pt's po intake remains less than 50% of estimated needs, please consider NGT placement with EN initiation for optimal nutrition. · Consult RD for tube feed order and management if within plan of care    Nutrition Assessment:  Pt remains on regular diet, ordering minimally such as one item per meal. C/o loss of appetite ongoing from PTA. Discussed MyPlate and trialing ONS at least once a day. Pt believes her appetite will become better once discharged. Encourage pt adequate nutrition for healing. Pt feels weak. Pt meets criteria of acute malnutrition. Malnutrition Assessment:  Malnutrition Status: Moderate malnutrition    Context:  Acute Illness     Findings of the 6 clinical characteristics of malnutrition:  Energy Intake:  7 - 50% or less of estimated energy requirements for 5 or more days (2 months, 6-7 weeks, including during stay)  Weight Loss:  No significant weight loss     Body Fat Loss:  No significant body fat loss     Muscle Mass Loss:  7 - Moderate muscle mass loss Thigh (quadraceps),Calf (gastrocnemius),Clavicles (pectoralis & deltoids),Scapula (trapezius)  Fluid Accumulation:  Unable to assess Extremities   Strength:  Not Performed    Estimated Daily Nutrient Needs:  Energy (kcal):  8346-2764 (1.1-1.2 stress factor); Weight Used for Energy Requirements:  Current     Protein (g):  68 (at least 1.2 g/kg); Weight Used for Protein Requirements:  Ideal        Fluid (ml/day):  7398-8399; Method Used for Fluid Requirements:  1 ml/kcal      Nutrition Related Findings:  POCT 142, better GI status per pt      Wounds:  None       Current Nutrition Therapies:    ADULT DIET; Regular  ADULT ORAL NUTRITION SUPPLEMENT; Breakfast, Lunch, Dinner;  Low Calorie/High Protein Oral Supplement    Anthropometric Measures:  · Height: 5' 5\" (165.1 cm)  · Current Body Weight: 194 lb 1.6 oz (88 kg)   · Admission Body Weight:  (stated)    · Usual Body Weight: 190 lb (86.2 kg) (stated per pt, limited wt hx)     · Ideal Body Weight: 125 lbs; % Ideal Body Weight 155.3 %   · BMI: 32.3  · BMI Categories: Obese Class 1 (BMI 30.0-34. 9)       Nutrition Diagnosis:   · Moderate malnutrition,In context of acute illness or injury related to  (loss of appetite related to illness) as evidenced by poor intake prior to admission,intake 0-25%,moderate muscle loss (po intake of 50% over 1 week)    Nutrition Interventions:   Food and/or Nutrient Delivery:  Continue Current Diet,Modify Oral Nutrition Supplement  Nutrition Education/Counseling:  Education completed (High fiber nutrition therapy and antidiarrheal nutrition tips)   Coordination of Nutrition Care:  Continue to monitor while inpatient    Goals:  Pt will consume greater than half of meals TID       Nutrition Monitoring and Evaluation:   Behavioral-Environmental Outcomes:  Knowledge or Skill,Readiness for Change,Beliefs and Attitutes   Food/Nutrient Intake Outcomes:  Food and Nutrient Intake,Supplement Intake  Physical Signs/Symptoms Outcomes:  Biochemical Data,GI Status,Hemodynamic Status,Meal Time Behavior,Weight     Discharge Planning:    Continue Oral Nutrition Supplement     Electronically signed by Jak Elias RD, LD on 3/31/22 at 1:00 PM EDT    Contact: [phone]

## 2022-03-31 NOTE — CARE COORDINATION
Reviewed chart and spoke with pt in IDR. Plan remains home with son and family to assist her as needed. She will get new PCP then ask for [facility]. from that PCP. No other needs identified at this time.

## 2022-03-31 NOTE — PROGRESS NOTES
103     INR:   Recent Labs     03/28/22  1209   INR 1.19       ASSESSMENT  Probable colon cancer with diffuse metastasis     RECOMMENDATION  Colonoscopy is non diagnostic. Her CEA is elevated at 29.9. CT guided biopsy of liver lesion was done on 3/28/22. Will follow up with the results. If pathology is consistent with metastatic disease, will discuss with her about chemotherapy. Ti-RADS 4 thyroid nodules - endocrinology is on the case. May need out patient FNA at a later date. She can be discharged home from oncology stand point. We will continue to follow the patient. Thank you.

## 2022-03-31 NOTE — DISCHARGE SUMMARY
V2.0  Discharge Summary    Name:  Johanna Stringer /Age/Sex: 1955 (43 y.o. female)   Admit Date: 3/23/2022  Discharge Date: 3/31/22    MRN & CSN:  [id number] & [id number] Encounter Date and Time 3/31/22 7:54 AM EDT    Attending:  Johanna Stringer DO Discharging Provider: [address] [place] [address] [address] Course:     Brief HPI: Johanna Stringer is a 73 y.o. female who presented with suspected GI bleed. Found to have abdominal carcinomatosis. Liver biopsy performed results pending. Surgery Oncology and GI recommending outpatient follow up. Brief Problem Based Course:     Abdominal carcinomatosis. Suspected primary colon cancer with metastasis  -CT abdomen/pelvis-metastatic disease likely related to suspected colon cancer involving the mid to distal sigmoid colon with adjacent spread into the left pelvis versus adenopathy.  Diffuse liver metastasis and diffuse peritoneal and omental and mesenteric metastasis. -Patient made aware of CT findings.  -Consult general surgery & Heme Onc  -Offered IV opioid medication for abdominal pain.  Patient declined. - Colonoscopy revealed no lesions for biopsy. Alli Aguirre elevated @29.9    - IR liver biopsy pending results.    - Patient may require mediport.  -Palliative care unavailable  - Patient prefers to go home and follow up outpatient.  She is agreeable to home health for PT/OT  -GS, Heme/Onc & Endo agreeable to outpatient follow up. - Mediport if needed can be placed outpatient  - Follow outpatient for iver biopsy results   - Follow outpatient with endocrinology for thyroid biopsy  - Patient insurance not covering home care due to no PCP seen in last 5 years. PCP follow up arranged by case management. PCP will arrange home health care needs. Patient instructed to follow up and take medications as directed.     GI bleed, stable, resolved. -Patient complaining of melanotic stools on admission  -Hemoglobin stable  -Monitor H&H  -Type and screen ordered.   -GI & GS following.     Acute respiratory failure, Stable  Patient was seen in hospital for COPD.  I am prescribing oxygen because the diagnosis and testing requires the patient to have oxygen in the home.  Conditions will improve or be benefited by oxygen use.  The patient is able to perform good mobility and therefore requires the use of a portable oxygen system for ambulation.     Discharge on 2L NC    SIRS, Possible HAP PNA. - Worsening Leukocytosis possibly secondary to metastatic cancer and recent steroid use. - Will monitor  -CT abdomen/pelvis-no acute abnormality of the lower chest noted. -UA not suggestive of infection, Procal 1.82, Repeat CXR clear, Pt afebrile.   -Chest x-ray Left lower lobe airspace opacity could represent atelectasis or pneumonia 3/26  - No CXR on admission to compare possible hospital associated pneumonia. -Given immunosenecent state with active cancer. Will cover for potential PNA. -Start Levaquin for empiric coverage.  -Monitor with repeat CBC.     Hypokalemia resolved, Hypomagnesemia repleated  - monitor and replete PRN     Multinodular idcaoo-QNO-eddoqo nodule  -As per the documentation-11/2017 patient's TSH was 0.0006 with hyperthyroidism.    -TSH < 0.010, fT4 1.73, fT3 2.4   - Ti-RADS 4 thyroid nodules, differ timing of FNA to Heme/onc & Endocrinology at this time. endocrinology & heme onc following     COPD not in exacerbation  -Not on home oxygen  -Does not use inhalers at home. - Patient had wheezing early in admission and required 3L NC.  - Currently on 2L  - Continue Oral steroid x 5 days. Stop 3/29/22  - Continue Albuterol & PRN Nebs. - IVF stopped patient tolerating regular diet.     Chronic tobacco dependence  -Admits to smoking 1.5 PPD  -Continue Nicotine patch  -Continue to encourage long term cessation     Obesity with BMI 31.62 class 1      The patient expressed appropriate understanding of, and agreement with the discharge recommendations, medications, and plan. Consults this admission:  IP CONSULT TO HOSPITALIST  IP CONSULT TO GI  IP CONSULT TO GENERAL SURGERY  IP CONSULT TO ONCOLOGY  IP CONSULT TO ENDOCRINOLOGY  IP CONSULT TO INTERVENTIONAL RADIOLOGY  IP CONSULT TO PALLIATIVE CARE  IP CONSULT TO HOME CARE NEEDS    Discharge Diagnosis:   GI bleed    Patient Active Problem List   Diagnosis    GI bleed    Polyp of sigmoid colon    Moderate malnutrition ([address]         Discharge Instruction:   Follow up appointments: PCP, GI, GS, Oncology  Primary care physician: CANDELARIA Hendrickson CNP within 2 weeks  Diet: regular diet   Activity: activity as tolerated  Disposition: Discharged to:   []Home, [x]HHC for O2, []SNF, []Acute Rehab, []Hospice   Condition on discharge: Stable  Labs and Tests to be Followed up as an outpatient by PCP or Specialist: Abnormal CT, Liver Biopsy. Discharge Medications:        Medication List      START taking these medications    acetaminophen 500 MG tablet  Commonly known as: TYLENOL  Take 1 tablet by mouth 4 times daily as needed for Pain     glycerin (ADULT) 2 g Supp suppository  Place 1 suppository rectally daily as needed for Constipation     metFORMIN 500 MG extended release tablet  Commonly known as: GLUCOPHAGE-XR  Take 1 tablet by mouth daily (with breakfast)  Replaces: metFORMIN 500 MG tablet     metoprolol tartrate 25 MG tablet  Commonly known as: LOPRESSOR  Take 1 tablet by mouth 2 times daily     nicotine 21 MG/24HR  Commonly known as: NICODERM CQ  Place 1 patch onto the skin daily     * traMADol 50 MG tablet  Commonly known as: Ultram  Take 1 tablet by mouth every 4 hours as needed for Pain for up to 5 days. Intended supply: 5 days. Take lowest dose possible to manage pain     * traMADol 50 MG tablet  Commonly known as: Ultram  Take 1 tablet by mouth every 4 hours as needed for Pain for up to 5 days. Intended supply: 7 days.  Take lowest dose possible to manage pain         * This list has 2 medication(s) that are the same as other medications prescribed for you. Read the directions carefully, and ask your doctor or other care provider to review them with you. CONTINUE taking these medications    Incruse Ellipta 62.5 MCG/INH Aepb  Generic drug: Umeclidinium Bromide     Ventolin  (90 Base) MCG/ACT inhaler  Generic drug: albuterol sulfate HFA        STOP taking these medications    metFORMIN 500 MG tablet  Commonly known as: GLUCOPHAGE  Replaced by: metFORMIN 500 MG extended release tablet           Where to Get Your Medications      These medications were sent to [facility] [address] [place] [address], [address]  [address] [place] [id number]    Phone: [phone]   · acetaminophen 500 MG tablet  · glycerin (ADULT) 2 g Supp suppository  · metFORMIN 500 MG extended release tablet  · metoprolol tartrate 25 MG tablet  · nicotine 21 MG/24HR     You can get these medications from any pharmacy    Bring a paper prescription for each of these medications  · traMADol 50 MG tablet  · traMADol 50 MG tablet        Objective Findings at Discharge:   /68   Pulse 78   Temp 97.7 °F (36.5 °C) (Oral)   Resp 18   Ht 5' 5\" (1.651 m)   Wt 194 lb 1.6 oz (88 kg)   SpO2 95%   BMI 32.30 kg/m²       Physical Exam:   General: NAD  Eyes: EOMI  ENT: neck supple  Cardiovascular: Regular rate. Respiratory: Clear to auscultation  Gastrointestinal: Soft, non tender  Genitourinary: no suprapubic tenderness  Musculoskeletal: No edema  Skin: warm, dry  Neuro: Alert. Psych: Mood appropriate. Labs and Imaging   US HEAD NECK SOFT TISSUE THYROID    Result Date: 3/26/2022  EXAMINATION: THYROID ULTRASOUND 3/26/2022 COMPARISON: None.  HISTORY: ORDERING SYSTEM PROVIDED HISTORY: high serum free t 4 ? hyperthyroid TECHNOLOGIST PROVIDED HISTORY: Reason for exam:->high serum free t 4 ? hyperthyroid Reason for Exam: Elevated t 4 FINDINGS: Right thyroid lobe:  5.3 x 2.4 x 1.8 cm Left thyroid lobe:  5 x 2.9 x 3 cm Isthmus:  11 mm in thickness Thyroid Gland: The thyroid gland demonstrates heterogeneous echotexture. Nodules: Approximately 5-10 nodules are present. 1. Left anterior measures 1.3 x 1.3 x 2.2 cm with mixed composition, hypoechoic echogenicity, wider than tall shape, smooth margins, and no internal echogenic foci. (TI-RADS 3) 2. Left mid measures 1.4 x 1.3 x 1.5 cm with spongiform composition, hypoechoic echogenicity, wider than tall shape, smooth margins, and no internal echogenic foci. (TI-RADS 1) 3. Left posterior measures 2.2 x 1.7 by 2.3 cm with mixed composition, hypoechoic echogenicity, wider than tall shape, irregular margins, and no internal echogenic foci. (TI-RADS 4) 4. Left mid measures 1.6 x 1.4 x 1.6 cm with spongiform composition, hypoechoic echogenicity, wider than tall shape, smooth margins, and peripheral calcifications. (TI-RADS 1) 5. Right mid measures 1.6 x 1.3 x 2.3 cm, has mixed composition, hypoechoic echogenicity, wider than tall shape, and irregular margins, and no internal echogenic foci. (TI-RADS 4) Cervical lymphadenopathy: No abnormal lymph nodes in the imaged portions of the neck. Multinodular goiter with bilateral 2.3 cm TI-RADS 4 nodules measuring 2.3 cm. These are moderately suspicious. FNA is recommended given size greater than 1.5 cm. XR CHEST PORTABLE    Result Date: 3/26/2022  EXAMINATION: ONE XRAY VIEW OF THE CHEST 3/26/2022 4:47 pm COMPARISON: 09/15/2017 HISTORY: ORDERING SYSTEM PROVIDED HISTORY: SOB TECHNOLOGIST PROVIDED HISTORY: Reason for exam:->SOB Reason for Exam: SOB FINDINGS: Cardiomediastinal silhouette stable. Left lower lobe airspace opacity. No pulmonary vascular congestion or edema. No pneumothorax.      Left lower lobe airspace opacity could represent atelectasis or pneumonia     XR CHEST 1 VIEW    Result Date: 3/29/2022  EXAMINATION: ONE XRAY VIEW OF THE CHEST 3/29/2022 3:44 pm COMPARISON: 03/26/2022 HISTORY: ORDERING SYSTEM PROVIDED HISTORY: SOB TECHNOLOGIST PROVIDED HISTORY: Reason for exam:->SOB Reason for Exam: SOB FINDINGS: The lungs are without acute focal process. There is no effusion or pneumothorax. The cardiomediastinal silhouette is without acute process. The osseous structures are without acute process. No acute process. IR BIOPSY LIVER PERCUTANEOUS    Result Date: 3/29/2022  PROCEDURE: IR BIOPSY LIVER PERCUTANEOUS 3/29/2022 HISTORY: ORDERING SYSTEM PROVIDED HISTORY: mass TECHNOLOGIST PROVIDED HISTORY: Reason for exam:->mass TECHNIQUE: Maximum sterile barrier technique including hand hygiene, skin prep and sterile ultrasound technique utilized for procedure. Sterile ultrasound technique also utilized for procedure Following informed consent, pause a confirm/time-out and ultrasound-guided puncture site selection, skin and ultrasound probe were prepped draped in sterile fashion. 10 mL 1% lidocaine without epinephrine for local anesthesia. Ultrasound-guided access of subcapsular hypoechoic mass adjacent to the gallbladder fossa was achieved. 25 gauge core biopsy specimens collected and sent for laboratory evaluation on slides and in formalin solution. Access needle removed. Postprocedure images made. Dressing applied. Patient tolerated procedure well. CONTRAST: None SEDATION: None FLUOROSCOPY DOSE AND TYPE OR TIME AND EXPOSURES: Ultrasound guidance/none DESCRIPTION OF PROCEDURE: Informed consent was obtained after a detailed explanation of the procedure including risks, benefits, and alternatives. Universal protocol was observed. As above in technique section FINDINGS: Pre and intraprocedural images demonstrate hypoechoic mass about the gallbladder fossa similar to abdomen pelvis CT 03/23/2022. Biopsy needle seen within target lesion. No complication suggested. Incidentally visualized portions of the gallbladder demonstrate echogenic foci reflecting stones.   No gallbladder wall thickening or pericholecystic fluid. Hepatobiliary scan suggested if clinical concern of acute cholecystitis. 25 gauge core biopsy specimens hypoechoic mass adjacent to the gallbladder fossa. Specimen sent for laboratory evaluation on slides and in formalin solution. No complication suggested. Gallstones without sonographic evidence of acute cholecystitis as visualized. CBC:   Recent Labs     03/29/22  0705 03/30/22  0605 03/31/22  0632   WBC 14.8* 16.1* 13.9*   HGB 13.1 12.8 11.7*    441* 404     BMP:    Recent Labs     03/29/22  0705 03/30/22  0605 03/31/22  0632    139 140   K 3.6 3.6 3.5   CL 93* 93* 96*   CO2 33* 31 31   BUN 11 12 12   CREATININE 0.2* 0.2* 0.2*   GLUCOSE 82 87 78     Hepatic:   Recent Labs     03/29/22  0705 03/30/22  0605 03/31/22  0632   AST 24 25 20   ALT 13 17 17   BILITOT 0.6 0.5 0.4   ALKPHOS 94 103 85     Lipids: No results found for: CHOL, HDL, TRIG  Hemoglobin A1C: No results found for: LABA1C  TSH: No results found for: TSH  Troponin: No results found for: TROPONINT  Lactic Acid: No results for input(s): LACTA in the last 72 hours. BNP: No results for input(s): PROBNP in the last 72 hours.   UA:  Lab Results   Component Value Date    NITRU NEGATIVE 03/30/2022    COLORU YELLOW 03/30/2022    WBCUA <1 03/30/2022    RBCUA 1 03/30/2022    MUCUS RARE 03/30/2022    YEAST RARE 03/24/2022    BACTERIA NEGATIVE 03/30/2022    CLARITYU CLEAR 03/30/2022    SPECGRAV 1.025 03/30/2022    LEUKOCYTESUR NEGATIVE 03/30/2022    UROBILINOGEN NORMAL 03/30/2022    BILIRUBINUR NEGATIVE 03/30/2022    BLOODU TRACE 03/30/2022    KETUA 15 03/30/2022     Urine Cultures: No results found for: LABURIN  Blood Cultures: No results found for: BC  No results found for: BLOODCULT2  Organism: No results found for: ORG    Time Spent Discharging patient 40 minutes    Electronically signed by Vinicio Greene DO on 3/31/2022 at 7:54 AM

## 2022-03-31 NOTE — PLAN OF CARE
Nutrition Problem #1: Moderate malnutrition,In context of acute illness or injury  Intervention: Food and/or Nutrient Delivery: Continue Current Diet,Modify Oral Nutrition Supplement  Nutritional Goals: Pt will consume greater than half of meals TID

## 2022-04-06 NOTE — PROGRESS NOTES
Dr Julita Miranda discussed patient with Dr Jay Otero at [facility] pathology. Pet CT skull base to mid thigh ordered.

## 2022-04-07 NOTE — TELEPHONE ENCOUNTER
Called patient at [phone] and spoke with patient's niece, Nathan Zepeda. She states that patient was recently at [facility] for 5 days and discharged home on 03/31/2022. Per niece, patient was admitted from ER d/t abnormal imaging (mass found on colon and liver). Patient had colonoscopy x2 and liver BX since release from hospital. Patient has not been feeling good for the past 7-8 weeks and kept illness from family until finally going to ER. Patient with unexplained weight loss, abdominal pain, and leg pain. Patient continues to c/o abdominal and BLE pain with no relief from OTC medication. Patient was given oxycodone 5 mg x5 days post discharge. Patient's niece gave patient suppository this morning and patient did have a small BM. Niece states it was formed, but dark. Patient scheduled for PET 04/13/2022 and scheduled to see Dr. Acosta Ballesteros on 04/19/2022. Nicaio inquiring about additional pain medication or other recommendations prior to appointment. Will discuss with covering physician and call danielle back.      Nathan Zepeda phone # [phone]

## 2022-04-07 NOTE — PROGRESS NOTES
Called patient's niece, Jasmyn Barrios at [phone] after discussing symptoms with physician. No new order for additional pain medication at this time. Advised to continue with OTC medications (alternate ibuprofen and tylenol), as well as other non-drug pain management therapies such as heat pack, ice, and relaxation. Patient to follow up with PCP with any further needs prior to being seen by oncologist on 04/19/2022. Nicaio states that patient does not have PCP which is the problem. Niece requesting that this RN reiterate symptoms and pain to physician since patient does not have PCP. Will notify physician and follow up with niece this evening or tomorrow. Voices understanding.

## 2022-04-07 NOTE — TELEPHONE ENCOUNTER
Called patient to notify about scan appointment. While on the phone patient stated she is in a lot of pain and wants to know what to do until she can get to be seen by Dr. Anaid Barney. Stated over the counter medication is not helping.  Please call and advise

## 2022-04-08 NOTE — PROGRESS NOTES
Dr. Greyson Orourke notified that patient does not have PCP and continues to c/o moderate to severe pain. Patient scheduled for PET on 04/13/2022 and new patient OV with MARY ANNE Valverde on 04/19/2022. RX for vicodin Q8 hours PRN x2 week supply e-scribed to [address] per physician order. Bjorn Cushing, patient's niece, at [phone] to notify. Niece appreciative and voices understanding. Appointment times confirmed. This RN direct contact information provided, should any additional needs or concerns present.

## 2022-04-08 NOTE — TELEPHONE ENCOUNTER
To called patient insurance regarding PA for Hydrocodone they indicated they would only approve short term supply or other medication and failed. NN will advise patient.

## 2022-04-08 NOTE — PROGRESS NOTES
Insurance denying pain medication stating any new controlled substance should have duration of no more than 7 days. Unable to obtain PA. Physicians unavailable and unable to change medciation. Per pharmacy, patient will have a $49 out-of-pocket expense. Patient's niece, Kamilla Strange called at [phone] to notify. Voices understanding.

## 2022-04-19 NOTE — PROGRESS NOTES
Patient Name:  Ambrose Souza  Patient :  1955  Patient MRN:  [id number]     Primary Oncologist: Shaista Garcia MD  Referring Provider: CANDELARAI Cardona CNP     Date of Service: 2022      Chief Complaint:    Chief Complaint   Patient presents with    New Patient     Patient Active Problem List:     GI bleed     Polyp of sigmoid colon     Moderate malnutrition ([address]    HPI:   Ambrose Souza is a 73year-old female with medical history significant for COPD, not on home oxygen, initially presented to [facility] ED on 3/23/22 with complaints of diffuse abdominal pain and melena for about 6 weeks duration. It is associated with diarrhea, dark stool. She has about 30 lbs weight loss over last 3 months.      She does not have primary care physician. She has last seen a physician for years ago. Emory Nguyenap not take any medications. She never had colonoscopy before.      CT abdomen/pelvis on 3/23/22 showed diffuse liver metastasis, diffuse peritoneal, omental and mesenteric metastasis, likely related to suspected colon carcinoma involving the mid to distal sigmoid colon with adjacent spread into the left pelvis versus adenopathy. She underwent colonoscopy on 3/25/22 and it was a poor prep. Repeat colonoscopy on 3/26/22 showed diverticulosis, internal hemorrhoids, sigmoid polyp removed with cold biopsy forceps. Poor prep. Pathology from sigmoid colon polyp showed benign colonic mucosal nodule with superficial features of hyperplastic polyp, 10 mm. She subsequently underwent CT guided biopsy of liver lesion on 3/28/22 and we sent specimen to [facility] clinic for second opinion. Final report revealed poorly differentiated carcinoma with immunophenotype consistent with metastatic colonic adenocarcinoma. PET/CT scan done on 22 showed hypermetabolic activity involving the sigmoid colon suggestive of primary malignancy.  Extensive metastases including numerous hepatic metastases, peritoneal carcinomatosis and metastatic lymphadenopathy in the upper chest, abdomen and pelvis. Thyroid nodule measuring 16 mm is of doubtful clinical significance.      On April 19, 2022, she presented to me for follow-up. I saw her in hospital for diffuse liver metastasis, peritoneal carcinomatosis and sigmoid colon lesion. On today's visit, I reviewed with her findings on final pathology from liver biopsy. I also reviewed with her findings on PET/CT scan and we reviewed her PET/CT scans together. We discussed about sending [facility] [employer] molecular panel as well as first line chemotherapy. She wants to discuss with her sister since she wants to get second opinion. I offered her to have second opinion at [address] [place] [address]. However, she stated that they are considering [facility] and 2 - 3 other hospitals to have second opinion. She stated that she will let us know about their decision. I will see her back in 3 weeks. She has tiredness, fatigue, poor appetite and weakness. Will start marinol for loss of appetite. She denies family history of colon cancer. Past Medical History:     Significant for  1. COPD    Past Surgery History:    Significant for  1. Tonsillectomy  2. Tubal ligation  3. Dilation and curettage    Social History:   She quits smoking since 2/2022. She denies alcohol drinking or illicit drug abuse. Family History:    No pertinent family history. No Known Allergies    Current Outpatient Medications on File Prior to Visit   Medication Sig Dispense Refill    HYDROcodone-acetaminophen (NORCO) 5-325 MG per tablet Take 1 tablet by mouth every 8 hours as needed for Pain for up to 15 days. Intended supply: 3 days. Take lowest dose possible to manage pain 45 tablet 0    docusate sodium (COLACE) 100 MG capsule Take 1 capsule by mouth 2 times daily 60 capsule 0     No current facility-administered medications on file prior to visit. Review of Systems:  Constitutional: Has weight loss. No fever, No chills, No night sweats. Energy level is low. Eyes:  No diplopia, No transient or permanent loss of vision, No scotomata. ENT / Mouth:  No epistaxis, No dysphagia, No hoarseness, No oral ulcers, No gingival bleeding. No sore throat, No postnasal drip, No nasal drip, No mouth pain, No sinus pain, No tinnitus, Normal hearing. Cardiovascular:  No chest pain, No palpitations, No syncope, No upper extremity edema, No lower extremity edema, No calf discomfort. Respiratory:  No cough. No hemoptysis, No pleurisy, No wheezing, No dyspnea. Breast:  No breast mass, No pain, No nipple discharge, No change in size, No change in shape. Gastrointestinal:  No abdominal pain, No abdominal cramping, No nausea, No vomiting, No constipation, No diarrhea, No hematochezia, No melena, No jaundice, No dyspepsia, No dysphagia. Urinary:  No dysuria, No hematuria, No urinary incontinence. Gynecological:  No vaginal discharge, No suprapubic pain, No abnormal vaginal bleeding. Musculoskeletal:  No muscle pain, No swollen joints, No joint redness, No bone pain, No spine tenderness. Skin:  No rash, No nodules, No pruritus, No lesions. Neurologic:  No confusion, No seizures, No syncope, No tremor, No speech change, No headache, No hiccups, No abnormal gait, No sensory changes, No weakness. Psychiatric:  No depression, No anxiety, Concentration normal.  Endocrine:  No polyuria, No polydipsia, No hot flashes, No thyroid symptoms. Hematologic:  No epistaxis, No gingival bleeding, No petechiae, No ecchymosis. Lymphatic:  No lymphadenopathy, No lymphedema. Allergy / Immunologic:  No eczema, No frequent mucous infections, No frequent respiratory infections, No recurrent urticarial, No frequent skin infections.      Vital Signs: /72 (Site: Left Upper Arm, Position: Sitting, Cuff Size: Medium Adult)   Pulse 115   Temp 95.7 °F (35.4 °C) (Infrared)   Resp 18   Ht 5' 5\" (1.651 m)   Wt 161 lb (73 kg)   SpO2 96%   BMI 26.79 kg/m²      Physical Exam:  CONSTITUTIONAL: awake, alert, cooperative, no apparent distress   EYES: pupils equal, round and reactive to light, sclera clear, normal conjunctiva  ENT: Normocephalic, without obvious abnormality, atraumatic  NECK: supple, symmetrical, no jugular venous distension, no carotid bruits   HEMATOLOGIC/LYMPHATIC: no cervical, supraclavicular or axillary lymphadenopathy   LUNGS: VBS, no wheezes, no increased work of breathing, no rhonchi, clear to auscultation, no crackles,    CARDIOVASCULAR: regular rate and rhythm, normal S1 and S2, no murmur noted  ABDOMEN: normal bowel sounds x 4, soft, non-distended, non-tender, no masses palpated, no hepatosplenomegaly   MUSCULOSKELETAL: full range of motion noted, tone is normal  NEUROLOGIC: awake, alert, oriented to name, place and time. Motor skills grossly intact. SKIN: appears intact, normal skin color, normal texture, normal turgor, no jaundice.    EXTREMITIES: no LE edema, no leg swelling, no cyanosis, no clubbing,       Labs:  Hematology:  Lab Results   Component Value Date    WBC 13.9 (H) 03/31/2022    RBC 4.34 03/31/2022    HGB 11.7 (L) 03/31/2022    HCT 37.6 03/31/2022    MCV 86.6 03/31/2022    MCH 27.0 03/31/2022    MCHC 31.1 (L) 03/31/2022    RDW 13.1 03/31/2022     03/31/2022    MPV 10.1 03/31/2022    SEGSPCT 73.8 (H) 03/31/2022    EOSRELPCT 0.5 03/31/2022    BASOPCT 0.1 03/31/2022    LYMPHOPCT 15.2 (L) 03/31/2022    MONOPCT 9.1 (H) 03/31/2022    SEGSABS 10.2 03/31/2022    EOSABS 0.1 03/31/2022    BASOSABS 0.0 03/31/2022    LYMPHSABS 2.1 03/31/2022    MONOSABS 1.3 03/31/2022    DIFFTYPE AUTOMATED DIFFERENTIAL 03/31/2022     No results found for: ESR  Chemistry:  Lab Results   Component Value Date     03/31/2022    K 3.5 03/31/2022    CL 96 (L) 03/31/2022    CO2 31 03/31/2022    BUN 12 03/31/2022    CREATININE 0.2 (L) 03/31/2022    GLUCOSE 78 03/31/2022    CALCIUM 9.0 03/31/2022    PROT 5.2 (L) 03/31/2022    LABALBU 2.7 (L) 03/31/2022    BILITOT 0.4 03/31/2022    ALKPHOS 85 03/31/2022    AST 20 03/31/2022    ALT 17 03/31/2022    LABGLOM >60 03/31/2022    GFRAA >60 03/31/2022    MG 1.8 03/30/2022    POCGLU 142 (H) 03/31/2022     No results found for: MMA, LDH, HOMOCYSTEINE  No components found for: LD  Lab Results   Component Value Date    TSHHS <0.010 (L) 03/27/2022    T4FREE 1.73 03/27/2022    FT3 2.4 03/27/2022     Immunology:  Lab Results   Component Value Date    PROT 5.2 (L) 03/31/2022     No results found for: Alberto Sermon, KLFLCR  No results found for: B2M  Coagulation Panel:  Lab Results   Component Value Date    PROTIME 15.4 (H) 03/28/2022    INR 1.19 03/28/2022    APTT 35.8 03/28/2022     Anemia Panel:  No results found for: [id number], FOLATE  Tumor Markers:  Lab Results   Component Value Date    CEA 29.9 03/25/2022        Observations:  PHQ-9 Total Score: 1 (4/19/2022  2:42 PM)     Assessment   Poorly differentiated carcinoma - immunostain patten is consistent with colon primary    Plan:  Melania Mcgrath is a 73year-old female initially presented on 3/23/22 with diffuse abdominal pain and melena for about 6 weeks duration. She has about 30 lbs weight loss over last 3 months.      CT abdomen/pelvis on 3/23/22 showed diffuse liver metastasis, diffuse peritoneal, omental and mesenteric metastasis, likely related to suspected colon carcinoma involving the mid to distal sigmoid colon with adjacent spread into the left pelvis versus adenopathy. She underwent colonoscopy on 3/25/22 and it was a poor prep. Repeat colonoscopy on 3/26/22 showed diverticulosis, internal hemorrhoids, sigmoid polyp removed with cold biopsy forceps. Poor prep. Pathology from sigmoid colon polyp showed benign colonic mucosal nodule with superficial features of hyperplastic polyp, 10 mm. She subsequently underwent CT guided biopsy of liver lesion on 3/28/22 and we sent specimen to [facility] clinic for second opinion.  Final report revealed poorly differentiated carcinoma with immunophenotype consistent with metastatic colonic adenocarcinoma. PET/CT scan done on 4/13/22 showed hypermetabolic activity involving the sigmoid colon suggestive of primary malignancy. Extensive metastases including numerous hepatic metastases, peritoneal carcinomatosis and metastatic lymphadenopathy in the upper chest, abdomen and pelvis. Thyroid nodule measuring 16 mm is of doubtful clinical significance.      On April 19, 2022, she presented to me for follow-up. I saw her in hospital for diffuse liver metastasis, peritoneal carcinomatosis and sigmoid colon lesion. On today's visit, I reviewed with her findings on final pathology from liver biopsy. I also reviewed with her findings on PET/CT scan and we reviewed her PET/CT scans together. We discussed about sending [employer] molecular panel as well as first line chemotherapy. She wants to discuss with her sister since she wants to get second opinion. I offered her to have second opinion at [facility]. However, she stated that they are considering [facility] and 2 - 3 other hospitals to have second opinion. She stated that she will let us know about their decision. I will see her back in 3 weeks. Will start marinol for loss of appetite. I answered all her questions and concerns for today. I asked her to follow up with primary care physician on regular basis. I will continue to keep you updated on her progress. Thank you for allowing me to participate in the care of this very pleasant patient. Recent imaging and labs were reviewed and discussed with the patient.

## 2022-04-19 NOTE — PROGRESS NOTES
MA Rooming Questions  Patient: Ana Padilla  MRN: [id number]    Date: 4/19/2022        NP    5. Did the patient have a depression screening completed today?  Yes    No data recorded     PHQ-9 Given to (if applicable):               PHQ-9 Score (if applicable):                     [] Positive     [x]  Negative              Does question #9 need addressed (if applicable)                     [] Yes    []  No               Casey Mcmanus MA

## 2022-05-03 NOTE — TELEPHONE ENCOUNTER
I called and lvm for patient regarding disability forms. We have no treatment plan and patient is not sure if will get treatment with our office or [facility].

## 2022-05-13 NOTE — PROGRESS NOTES
MA Rooming Questions  Patient: Rebecca Addison  MRN: [id number]    Date: 5/13/2022        1. Do you have any new issues? yes - Pt c/o bowel movements every half hour and nausea        2. Do you need any refills on medications?    no    3. Have you had any imaging done since your last visit?   no    4. Have you been hospitalized or seen in the emergency room since your last visit here?   no    5. Did the patient have a depression screening completed today?  No    No data recorded     PHQ-9 Given to (if applicable):               PHQ-9 Score (if applicable):                     [] Positive     []  Negative              Does question #9 need addressed (if applicable)                     [] Yes    []  No               Jeremi Oropeza MA

## 2022-05-16 PROBLEM — C18.9 METASTATIC COLON CANCER TO LIVER (HCC): Status: ACTIVE | Noted: 2022-01-01

## 2022-05-16 PROBLEM — C78.7 METASTATIC COLON CANCER TO LIVER (HCC): Status: ACTIVE | Noted: 2022-01-01

## 2022-05-16 NOTE — TELEPHONE ENCOUNTER
Patient left a message stating that she was in on Friday and now she is having a lot of pain and would like something called in for her, please call

## 2022-05-16 NOTE — TELEPHONE ENCOUNTER
Called patient at [phone] regarding pain issues. Patient c/o severe pain to left side. Describes pain as sharp and stabbing. No other symptoms noted per patient. Patient has tried OTC medication including tylenol and aleve with no relief as well as non pharmacological interventions for pain. Dr. Daniel Azevedo notified. Labs reviewed. New order for vicodin for pain management. RX for hydrocodone-acetaminophen 5-325 mg Q4 hours PRN x10 days sent to [facility] per physician order. Called patient back to notify. Voices understanding.

## 2022-05-16 NOTE — PROGRESS NOTES
Patient Name:  Josee Collazo  Patient :  1955  Patient MRN:  [id number]     Primary Oncologist: Rosalva Gatica MD  Referring Provider: CANDELARIA Montejo CNP     Date of Service: 2022      Chief Complaint:    Chief Complaint   Patient presents with    Follow-up     Patient Active Problem List:     GI bleed     Polyp of sigmoid colon     Moderate malnutrition ([address]    HPI:   Josee Collazo is a 73year-old female with medical history significant for COPD, not on home oxygen, initially presented to [facility] ED on 3/23/22 with complaints of diffuse abdominal pain and melena for about 6 weeks duration. It is associated with diarrhea, dark stool. She has about 30 lbs weight loss over last 3 months.      She does not have primary care physician. She has last seen a physician for years ago. Dalton Reyesume not take any medications. She never had colonoscopy before.      CT abdomen/pelvis on 3/23/22 showed diffuse liver metastasis, diffuse peritoneal, omental and mesenteric metastasis, likely related to suspected colon carcinoma involving the mid to distal sigmoid colon with adjacent spread into the left pelvis versus adenopathy. She underwent colonoscopy on 3/25/22 and it was a poor prep. Repeat colonoscopy on 3/26/22 showed diverticulosis, internal hemorrhoids, sigmoid polyp removed with cold biopsy forceps. Poor prep. Pathology from sigmoid colon polyp showed benign colonic mucosal nodule with superficial features of hyperplastic polyp, 10 mm. She subsequently underwent CT guided biopsy of liver lesion on 3/28/22 and we sent specimen to [facility] clinic for second opinion. Final report revealed poorly differentiated carcinoma with immunophenotype consistent with metastatic colonic adenocarcinoma. PET/CT scan done on 22 showed hypermetabolic activity involving the sigmoid colon suggestive of primary malignancy.  Extensive metastases including numerous hepatic metastases, peritoneal carcinomatosis and metastatic lymphadenopathy in the upper chest, abdomen and pelvis. Thyroid nodule measuring 16 mm is of doubtful clinical significance.      On May 16, 2022, she presented to me for follow-up. I saw her in hospital for diffuse liver metastasis, peritoneal carcinomatosis and sigmoid colon lesion. I reviewed with her findings on final pathology from liver biopsy. I also reviewed with her findings on PET/CT scan and we reviewed her PET/CT scans together. Her CEA on 3/25/22 was 29.9. We discussed about sending BOBBY DEL ROSARIO molecular panel as well as first line chemotherapy. She is going to have second opinion at [facility] on 5/20/22. She is feeling weak, nausea, vomiting, loss of appetite, poor oral intake and tenesmus. Her insurance doesn't approve marinol. Will start dexamethasone short couse 2 mg daily and zofran today. Clinically she has metastatic colon cancer and we discussed about starting chemotherapy with FOLFOX first and to add either avastin or EGFR inhibitor based on findings on molecular study. She lives alone and she is concerns that she will not have somebody to help her after chemo. After long discussion, she agrees to have first cycle of chemo FOLFOX in hospital.     We will plan to start it on 5/23/22 (will direct admit her to hospital). Will request all RAMON study, BRAF, BOBBY GPS panel today. Will arrange for [facility] placement during hospital stay. She denies family history of colon cancer. She doesn't have any other significant symptoms at today visit. Past Medical History:     Significant for  1. COPD    Past Surgery History:    Significant for  1. Tonsillectomy  2. Tubal ligation  3. Dilation and curettage    Social History:   She quits smoking since 2/2022. She denies alcohol drinking or illicit drug abuse. Family History:    No pertinent family history.     No Known Allergies    Current Outpatient Medications on File Prior to Visit   Medication Sig Dispense Refill    docusate sodium (COLACE) 100 MG capsule Take 1 capsule by mouth 2 times daily 60 capsule 0    dronabinol (MARINOL) 5 MG capsule Take 1 capsule by mouth 2 times daily (before meals) for 30 days. (Patient not taking: Reported on 5/13/2022) 60 capsule 2     No current facility-administered medications on file prior to visit. Review of Systems:  Constitutional: Has weight loss. No fever, No chills, No night sweats. Energy level is low. Eyes:  No diplopia, No transient or permanent loss of vision, No scotomata. ENT / Mouth:  No epistaxis, No dysphagia, No hoarseness, No oral ulcers, No gingival bleeding. No sore throat, No postnasal drip, No nasal drip, No mouth pain, No sinus pain, No tinnitus, Normal hearing. Cardiovascular:  No chest pain, No palpitations, No syncope, No upper extremity edema, No lower extremity edema, No calf discomfort. Respiratory:  No cough. No hemoptysis, No pleurisy, No wheezing, No dyspnea. Breast:  No breast mass, No pain, No nipple discharge, No change in size, No change in shape. Gastrointestinal:  No abdominal pain, No abdominal cramping, No nausea, No vomiting, No constipation, No diarrhea, No hematochezia, No melena, No jaundice, No dyspepsia, No dysphagia. Urinary:  No dysuria, No hematuria, No urinary incontinence. Gynecological:  No vaginal discharge, No suprapubic pain, No abnormal vaginal bleeding. Musculoskeletal:  No muscle pain, No swollen joints, No joint redness, No bone pain, No spine tenderness. Skin:  No rash, No nodules, No pruritus, No lesions. Neurologic:  No confusion, No seizures, No syncope, No tremor, No speech change, No headache, No hiccups, No abnormal gait, No sensory changes, No weakness. Psychiatric:  No depression, No anxiety, Concentration normal.  Endocrine:  No polyuria, No polydipsia, No hot flashes, No thyroid symptoms. Hematologic:  No epistaxis, No gingival bleeding, No petechiae, No ecchymosis.   Lymphatic:  No lymphadenopathy, No lymphedema. Allergy / Immunologic:  No eczema, No frequent mucous infections, No frequent respiratory infections, No recurrent urticarial, No frequent skin infections. Vital Signs: /76 (Site: Right Upper Arm, Position: Sitting, Cuff Size: Medium Adult)   Pulse 121   Temp 95.1 °F (35.1 °C) (Infrared)   Resp 18   Ht 5' 5\" (1.651 m)   SpO2 97%   BMI 26.79 kg/m²      Physical Exam:  CONSTITUTIONAL: awake, alert, cooperative, no apparent distress   EYES: pupils equal, round and reactive to light, sclera clear, normal conjunctiva  ENT: Normocephalic, without obvious abnormality, atraumatic  NECK: supple, symmetrical, no jugular venous distension, no carotid bruits   HEMATOLOGIC/LYMPHATIC: no cervical, supraclavicular or axillary lymphadenopathy   LUNGS: VBS, no wheezes, no increased work of breathing, no rhonchi, clear to auscultation, no crackles,    CARDIOVASCULAR: regular rate and rhythm, normal S1 and S2, no murmur noted  ABDOMEN: normal bowel sounds x 4, soft, non-distended, non-tender, no masses palpated, no hepatosplenomegaly   MUSCULOSKELETAL: full range of motion noted, tone is normal  NEUROLOGIC: awake, alert, oriented to name, place and time. Motor skills grossly intact. SKIN: appears intact, normal skin color, normal texture, normal turgor, no jaundice.    EXTREMITIES: no LE edema, no leg swelling, no cyanosis, no clubbing,       Labs:  Hematology:  Lab Results   Component Value Date    WBC 13.9 (H) 03/31/2022    RBC 4.34 03/31/2022    HGB 11.7 (L) 03/31/2022    HCT 37.6 03/31/2022    MCV 86.6 03/31/2022    MCH 27.0 03/31/2022    MCHC 31.1 (L) 03/31/2022    RDW 13.1 03/31/2022     03/31/2022    MPV 10.1 03/31/2022    SEGSPCT 73.8 (H) 03/31/2022    EOSRELPCT 0.5 03/31/2022    BASOPCT 0.1 03/31/2022    LYMPHOPCT 15.2 (L) 03/31/2022    MONOPCT 9.1 (H) 03/31/2022    SEGSABS 10.2 03/31/2022    EOSABS 0.1 03/31/2022    BASOSABS 0.0 03/31/2022    LYMPHSABS 2.1 03/31/2022    MONOSABS 1.3 03/31/2022    DIFFTYPE AUTOMATED DIFFERENTIAL 03/31/2022     No results found for: ESR  Chemistry:  Lab Results   Component Value Date     03/31/2022    K 3.5 03/31/2022    CL 96 (L) 03/31/2022    CO2 31 03/31/2022    BUN 12 03/31/2022    CREATININE 0.2 (L) 03/31/2022    GLUCOSE 78 03/31/2022    CALCIUM 9.0 03/31/2022    PROT 5.2 (L) 03/31/2022    LABALBU 2.7 (L) 03/31/2022    BILITOT 0.4 03/31/2022    ALKPHOS 85 03/31/2022    AST 20 03/31/2022    ALT 17 03/31/2022    LABGLOM >60 03/31/2022    GFRAA >60 03/31/2022    MG 1.8 03/30/2022    POCGLU 142 (H) 03/31/2022     No results found for: MMA, LDH, HOMOCYSTEINE  No components found for: LD  Lab Results   Component Value Date    TSHHS <0.010 (L) 03/27/2022    T4FREE 1.73 03/27/2022    FT3 2.4 03/27/2022     Immunology:  Lab Results   Component Value Date    PROT 5.2 (L) 03/31/2022     No results found for: Osman Carp, KLFLCR  No results found for: B2M  Coagulation Panel:  Lab Results   Component Value Date    PROTIME 15.4 (H) 03/28/2022    INR 1.19 03/28/2022    APTT 35.8 03/28/2022     Anemia Panel:  No results found for: [id number], FOLATE  Tumor Markers:  Lab Results   Component Value Date    CEA 29.9 03/25/2022        Observations:  No data recorded     Assessment   Poorly differentiated carcinoma - immunostain patten is consistent with colon primary    Plan:  Aquiles Verma is a 73year-old female initially presented on 3/23/22 with diffuse abdominal pain and melena for about 6 weeks duration. She has about 30 lbs weight loss over last 3 months.      CT abdomen/pelvis on 3/23/22 showed diffuse liver metastasis, diffuse peritoneal, omental and mesenteric metastasis, likely related to suspected colon carcinoma involving the mid to distal sigmoid colon with adjacent spread into the left pelvis versus adenopathy. She underwent colonoscopy on 3/25/22 and it was a poor prep.  Repeat colonoscopy on 3/26/22 showed diverticulosis, internal hemorrhoids, sigmoid polyp removed with cold biopsy forceps. Poor prep. Pathology from sigmoid colon polyp showed benign colonic mucosal nodule with superficial features of hyperplastic polyp, 10 mm. She subsequently underwent CT guided biopsy of liver lesion on 3/28/22 and we sent specimen to [facility] clinic for second opinion. Final report revealed poorly differentiated carcinoma with immunophenotype consistent with metastatic colonic adenocarcinoma. PET/CT scan done on 4/13/22 showed hypermetabolic activity involving the sigmoid colon suggestive of primary malignancy. Extensive metastases including numerous hepatic metastases, peritoneal carcinomatosis and metastatic lymphadenopathy in the upper chest, abdomen and pelvis. Thyroid nodule measuring 16 mm is of doubtful clinical significance.      On May 16, 2022, she presented to me for follow-up. I saw her in hospital for diffuse liver metastasis, peritoneal carcinomatosis and sigmoid colon lesion. I reviewed with her findings on final pathology from liver biopsy. I also reviewed with her findings on PET/CT scan and we reviewed her PET/CT scans together. Her CEA on 3/25/22 was 29.9. We discussed about sending BOBBY DEL ROSARIO molecular panel as well as first line chemotherapy. She is going to have second opinion at [facility] on 5/20/22. She is feeling weak, nausea, vomiting, loss of appetite, poor oral intake and tenesmus. Her insurance doesn't approve marinol. Will start dexamethasone short couse 2 mg daily and zofran today. Clinically she has metastatic colon cancer and we discussed about starting chemotherapy with FOLFOX first and to add either avastin or EGFR inhibitor based on findings on molecular study. She lives alone and she is concerns that she will not have somebody to help her after chemo.      After long discussion, she agrees to have first cycle of chemo FOLFOX in hospital.     We will plan to start it on 5/23/22 (will direct admit her to hospital). Will request all RAMON study, BRAF, CARIS GPS panel today. Will arrange for [employer] placement during hospital stay. I answered all her questions and concerns for today. I asked her to follow up with primary care physician on regular basis. Recent imaging and labs were reviewed and discussed with the patient.

## 2022-05-16 NOTE — PROGRESS NOTES
Per Dr. Anaid Barney, order and required medical records faxed to [facility] @ [phone] for further molecular testing to benefit and determine treatment planning for patient. Request for KRAS, NRAS, BRAF, MSI/MMR faxed to pathology @ [phone] per physician order.

## 2022-05-17 NOTE — PROGRESS NOTES
Physician requesting patient be IP x3 days for first Folfox chemo tx. Patient will also need port placement during admission. Called [facility] and spoke with Malvin Jauregui RN. Explained situation and she will relay information to [facility] and [facility] Nurse Manager and have her jan this RN back. Direct contact information provided.

## 2022-05-18 NOTE — PROGRESS NOTES
Received call from RN Supervisor on 4[facility] Oncology Unit at [facility] regarding IP stay for continuous chemo infusion. Called back at EXT [phone] as instructed. No answer, ROS left.

## 2022-05-18 NOTE — PROGRESS NOTES
Received call back from Nurse Supervisor on [facility] regarding tx regimen. Explained that order is for Oxaliplatin + Leucovorin + Fluorouracil. 5 FU to be administered continuously over 46 hours. Patient will need port placement during admission prior to start of chemo. Charge RN aware and will begin process for IP bed.

## 2022-05-20 NOTE — PROGRESS NOTES
Received call back from Carol Lanedrs RN Supervisor from [facility] with update. States that chemo can definitely be given as IP, but still working on staffing for day shift RN (night shift RN covered) for chemo infusion and RN in IR for port placement. States she will also reach out to Director on how to proceed and call this RN back later today. TX will likely not start Monday 05/23/2022 as anticipated by physician. Will notify physician and call patient.

## 2022-05-20 NOTE — PROGRESS NOTES
Physician notified that chem tx as IP delayed and will not start this Monday 05/23/2022 as previously request, but still working on getting patient an IP bed. Voices understanding. Called patient at [phone] to provide update. No answer. VM left with this RN direct contact information requesting call back. Will continue to follow up next week.

## 2022-05-20 NOTE — PROGRESS NOTES
Attempted to call [facility] RN Supervisor to inquire about update on IP bed status for chemo tx, but no answer at this time. VM left requesting call back.

## 2022-05-24 NOTE — PROGRESS NOTES
Informed by Dr. Gabrielle Lara that patient is currently IP at [facility] ans will likely be referred to hospice. Patient no longer needs IP bed at [facility] for chemo infusion per physician. Amber Whitney, Nurse Supervisor on 4N at [facility] to notify, ROS left. Will update [facility] charge RN.

## 2022-05-24 NOTE — PROGRESS NOTES
Called 4[facility] nurse supervisor at EXT [phone] for update on need for IP status and chemo infusion. States she has a meeting scheduled with [facility] charge RN and [facility] Director today @ 1000 to discuss staffing for IP chemo. Will keep this RN updated.

## 2022-07-26 NOTE — CARE COORDINATION
Reviewed chart and spoke with pt about discharge needs/plans. Pt lives with her adult son, PTA she was totally independent. She has lots of family support who will assist her as needed. She does not have a PCP, list placed in AVS. She does have insurance and can afford her medications. Pt denies any needs at this time. CM available if any needs arise. [phone]

## 2022-12-06 NOTE — PROGRESS NOTES
Left vmail for patient to return call to discuss surgery options.       Tom Torres, Surgery Scheduler       V2.0  [facility] Hospitalist Progress Note      Name:  Galileo Sawyer /Age/Sex: 1955  (73 y.o. female)   MRN & CSN:  [id number] & [id number] Encounter Date/Time: 3/30/2022 8:34 PM EDT    [facility]:  [facility] PCP: Reyna Rosenberg, [address] [address] Day: 8    Assessment and Plan:   Galileo Sawyer is a 73 y.o. female with presents with GI bleed     GI bleed, stable, resolved. -Patient complaining of melanotic stools on admission  -Hemoglobin stable  -Monitor H&H  -Type and screen ordered. -GI & GS following.     Suspected primary colon cancer with metastasis  -CT abdomen/pelvis-metastatic disease likely related to suspected colon cancer involving the mid to distal sigmoid colon with adjacent spread into the left pelvis versus adenopathy. Diffuse liver metastasis and diffuse peritoneal and omental and mesenteric metastasis. -Patient made aware of CT findings.  -Consult general surgery & Heme Onc  -Offered IV opioid medication for abdominal pain.  Patient declined. - Colonoscopy revealed no lesions for biopsy. CEA elevated @29.9    - IR liver biopsy pending results. - Patient may require mediport.  -Palliative care consult to help patient decide care goals and symptom management.   - Patient prefers to go home and follow up outpatient. She is agreeable to home health for PT/OT  -GS, Heme/Onc & Endo agreeable to outpatient follow up. - Mediport if needed can be placed outpatient  - Follow outpatient for iver biopsy results   - Follow outpatient with endocrinology for thyroid biopsy  - UA, Procal, repeat CXR, pending.  - UA pending    Acute respiratory failure  87% on ambulation, 90% a rest.  Qualify for home O2     SIRS, Possible HAP PNA. - Worsening Leukocytosis possibly secondary to metastatic cancer and recent steroid use. - Will monitor  -CT abdomen/pelvis-no acute abnormality of the lower chest noted.   -UA not suggestive of infection, Procal 1.82, Repeat CXR clear, Pt afebrile.   -Chest x-ray Left lower lobe airspace opacity could represent atelectasis or pneumonia 3/26  - No CXR on admission to compare possible hospital associated pneumonia. -Given immunosenecent state with active cancer. Will cover for potential PNA. -Start Levaquin for empiric coverage.  -Monitor with repeat CBC. Hypokalemia resolved, Hypomagnesemia repleated  - monitor and replete PRN     Multinodular ltfwvl-UQW-ltecdm nodule  -As per the documentation-11/2017 patient's TSH was 0.0006 with hyperthyroidism.    -TSH < 0.010, fT4 1.73, fT3 2.4   - Ti-RADS 4 thyroid nodules, differ timing of FNA to Heme/onc & Endocrinology at this time. endocrinology & heme onc following     COPD not in exacerbation  -Not on home oxygen  -Does not use inhalers at home. - Patient had wheezing early in admission and required 3L NC.  - Currently on 2L  - Continue Oral steroid x 5 days. Stop 3/29/22  - Continue Albuterol & PRN Nebs. - IVF stopped patient tolerating regular diet.     Chronic tobacco dependence  -Admits to smoking 1.5 PPD  -Continue Nicotine patch  -Continue to encourage long term cessation     Obesity with BMI 31.62 class 1      Diet ADULT DIET; Regular  ADULT ORAL NUTRITION SUPPLEMENT; Breakfast, Lunch, Dinner;  Low Calorie/High Protein Oral Supplement   DVT Prophylaxis [] Lovenox, []  Heparin, [] SCDs, [] Ambulation,  [] Eliquis, [] Xarelto  [] Coumadin X high bleeding risk with recent GI bleed   GI Prophylaxis [] PPI,  [x] H2 Blocker,  [] Carafate,  [] Diet,  [] No GI prophylaxis, N/A: patient is not under significant medical stress, non-ICU or is receiving a diet/tube feeds   Code Status Full Code   Disposition From: Home  Expected Disposition: Patient prefers Home  Estimated Date of Discharge: 1- 2 days  Patient requires continued admission due to 3/30/22   Surrogate Decision Maker/ POA  Sister Malvin Zepeda   MDM [] Low, [] Moderate,[x]  High  Patient's risk as above due to:      [] One or more chronic illnesses with mild History Narrative    None     Social Determinants of Health     Financial Resource Strain:     Difficulty of Paying Living Expenses: Not on file   Food Insecurity:     Worried About Running Out of Food in the Last Year: Not on file    Kristy of Food in the Last Year: Not on file   Transportation Needs:     Lack of Transportation (Medical): Not on file    Lack of Transportation (Non-Medical):  Not on file   Physical Activity:     Days of Exercise per Week: Not on file    Minutes of Exercise per Session: Not on file   Stress:     Feeling of Stress : Not on file   Social Connections:     Frequency of Communication with Friends and Family: Not on file    Frequency of Social Gatherings with Friends and Family: Not on file    Attends Caodaism Services: Not on file    Active Member of [address] [place] [address] [employer] or Organizations: Not on file    Attends Club or Organization Meetings: Not on file    Marital Status: Not on file   Intimate Partner Violence:     Fear of Current or Ex-Partner: Not on file    Emotionally Abused: Not on file    Physically Abused: Not on file    Sexually Abused: Not on file   Housing Stability:     Unable to Pay for Housing in the Last Year: Not on file    Number of Places Lived in the Last Year: Not on file    Unstable Housing in the Last Year: Not on file       Medications:   Medications:    levofloxacin  750 mg IntraVENous Q24H    famotidine  20 mg Oral BID    azelastine  1 spray Each Nostril BID    metoprolol tartrate  25 mg Oral BID    predniSONE  40 mg Oral Daily    insulin lispro  0-6 Units SubCUTAneous TID WC    insulin lispro  0-3 Units SubCUTAneous Nightly    sodium chloride flush  5-40 mL IntraVENous 2 times per day    nicotine  1 patch TransDERmal Daily      Infusions:    sodium chloride       PRN Meds: oxyCODONE-acetaminophen, 1 tablet, Q4H PRN  acetaminophen, 500 mg, Q6H PRN   Or  acetaminophen, 325 mg, Q6H PRN  glycerin (ADULT), 1 suppository, Daily PRN  HYDROmorphone, 0.5 mg, Q4H PRN  ipratropium-albuterol, 1 ampule, Q4H PRN  sodium chloride flush, 5-40 mL, PRN  sodium chloride, 25 mL, PRN  ondansetron, 4 mg, Q8H PRN   Or  ondansetron, 4 mg, Q6H PRN  albuterol sulfate HFA, 2 puff, Q6H PRN          Labs      Recent Results (from the past 24 hour(s))   POCT Glucose    Collection Time: 03/29/22  5:14 PM   Result Value Ref Range    POC Glucose 161 (H) 70 - 99 MG/DL   POCT Glucose    Collection Time: 03/29/22  8:00 PM   Result Value Ref Range    POC Glucose 132 (H) 70 - 99 MG/DL   POCT Glucose    Collection Time: 03/29/22  8:11 PM   Result Value Ref Range    POC Glucose 133 (H) 70 - 99 MG/DL   Urinalysis with Reflex to Culture    Collection Time: 03/30/22 12:08 AM    Specimen: Urine   Result Value Ref Range    Color, UA YELLOW YELLOW    Clarity, UA CLEAR CLEAR    Glucose, Urine NEGATIVE NEGATIVE MG/DL    Bilirubin Urine NEGATIVE NEGATIVE MG/DL    Ketones, Urine 15 (A) NEGATIVE MG/DL    Specific Gravity, UA 1.025 1.001 - 1.035    Blood, Urine TRACE (A) NEGATIVE    pH, Urine 6.5 5.0 - 8.0    Protein, UA TRACE (A) NEGATIVE MG/DL    Urobilinogen, Urine NORMAL 0.2 - 1.0 MG/DL    Nitrite Urine, Quantitative NEGATIVE NEGATIVE    Leukocyte Esterase, Urine NEGATIVE NEGATIVE    RBC, UA 1 0 - 6 /HPF    WBC, UA <1 0 - 5 /HPF    Bacteria, UA NEGATIVE NEGATIVE /HPF    Squam Epithel, UA <1 /HPF    Mucus, UA RARE (A) NEGATIVE HPF   POCT Glucose    Collection Time: 03/30/22  6:00 AM   Result Value Ref Range    POC Glucose 100 (H) 70 - 99 MG/DL   CBC with Auto Differential    Collection Time: 03/30/22  6:05 AM   Result Value Ref Range    WBC 16.1 (H) 4.0 - 10.5 K/CU MM    RBC 4.65 4.2 - 5.4 M/CU MM    Hemoglobin 12.8 12.5 - 16.0 GM/DL    Hematocrit 39.2 37 - 47 %    MCV 84.3 78 - 100 FL    MCH 27.5 27 - 31 PG    MCHC 32.7 32.0 - 36.0 %    RDW 12.9 11.7 - 14.9 %    Platelets 385 (H) 139 - 440 K/CU MM    MPV 10.7 7.5 - 11.1 FL    Differential Type AUTOMATED DIFFERENTIAL     Segs Relative 78.9 (H) 36 - 66 % Lymphocytes % 10.1 (L) 24 - 44 %    Monocytes % 9.6 (H) 0 - 4 %    Eosinophils % 0.4 0 - 3 %    Basophils % 0.1 0 - 1 %    Segs Absolute 12.7 K/CU MM    Lymphocytes Absolute 1.6 K/CU MM    Monocytes Absolute 1.5 K/CU MM    Eosinophils Absolute 0.1 K/CU MM    Basophils Absolute 0.0 K/CU MM    Nucleated RBC % 0.0 %    Total Nucleated RBC 0.0 K/CU MM    Total Immature Neutrophil 0.14 K/CU MM    Immature Neutrophil % 0.9 (H) 0 - 0.43 %   Comprehensive Metabolic Panel    Collection Time: 03/30/22  6:05 AM   Result Value Ref Range    Sodium 139 135 - 145 MMOL/L    Potassium 3.6 3.5 - 5.1 MMOL/L    Chloride 93 (L) 99 - 110 mMol/L    CO2 31 21 - 32 MMOL/L    BUN 12 6 - 23 MG/DL    CREATININE 0.2 (L) 0.6 - 1.1 MG/DL    Glucose 87 70 - 99 MG/DL    Calcium 9.3 8.3 - 10.6 MG/DL    Albumin 3.0 (L) 3.4 - 5.0 GM/DL    Total Protein 5.9 (L) 6.4 - 8.2 GM/DL    Total Bilirubin 0.5 0.0 - 1.0 MG/DL    ALT 17 10 - 40 U/L    AST 25 15 - 37 IU/L    Alkaline Phosphatase 103 40 - 129 IU/L    GFR Non-African American >60 >60 mL/min/1.73m2    GFR African American >60 >60 mL/min/1.73m2    Anion Gap 15 4 - 16   Magnesium    Collection Time: 03/30/22  6:05 AM   Result Value Ref Range    Magnesium 1.8 1.8 - 2.4 mg/dl   POCT Glucose    Collection Time: 03/30/22  8:18 AM   Result Value Ref Range    POC Glucose 95 70 - 99 MG/DL        Imaging/Diagnostics Last 24 Hours   XR CHEST 1 VIEW    Result Date: 3/29/2022  EXAMINATION: ONE XRAY VIEW OF THE CHEST 3/29/2022 3:44 pm COMPARISON: 03/26/2022 HISTORY: ORDERING SYSTEM PROVIDED HISTORY: SOB TECHNOLOGIST PROVIDED HISTORY: Reason for exam:->SOB Reason for Exam: SOB FINDINGS: The lungs are without acute focal process. There is no effusion or pneumothorax. The cardiomediastinal silhouette is without acute process. The osseous structures are without acute process. No acute process.      IR BIOPSY LIVER PERCUTANEOUS    Result Date: 3/29/2022  PROCEDURE: IR BIOPSY LIVER PERCUTANEOUS 3/29/2022 HISTORY: ORDERING SYSTEM PROVIDED HISTORY: mass TECHNOLOGIST PROVIDED HISTORY: Reason for exam:->mass TECHNIQUE: Maximum sterile barrier technique including hand hygiene, skin prep and sterile ultrasound technique utilized for procedure. Sterile ultrasound technique also utilized for procedure Following informed consent, pause a confirm/time-out and ultrasound-guided puncture site selection, skin and ultrasound probe were prepped draped in sterile fashion. 10 mL 1% lidocaine without epinephrine for local anesthesia. Ultrasound-guided access of subcapsular hypoechoic mass adjacent to the gallbladder fossa was achieved. 25 gauge core biopsy specimens collected and sent for laboratory evaluation on slides and in formalin solution. Access needle removed. Postprocedure images made. Dressing applied. Patient tolerated procedure well. CONTRAST: None SEDATION: None FLUOROSCOPY DOSE AND TYPE OR TIME AND EXPOSURES: Ultrasound guidance/none DESCRIPTION OF PROCEDURE: Informed consent was obtained after a detailed explanation of the procedure including risks, benefits, and alternatives. Universal protocol was observed. As above in technique section FINDINGS: Pre and intraprocedural images demonstrate hypoechoic mass about the gallbladder fossa similar to abdomen pelvis CT 03/23/2022. Biopsy needle seen within target lesion. No complication suggested. Incidentally visualized portions of the gallbladder demonstrate echogenic foci reflecting stones. No gallbladder wall thickening or pericholecystic fluid. Hepatobiliary scan suggested if clinical concern of acute cholecystitis. 25 gauge core biopsy specimens hypoechoic mass adjacent to the gallbladder fossa. Specimen sent for laboratory evaluation on slides and in formalin solution. No complication suggested. Gallstones without sonographic evidence of acute cholecystitis as visualized.          Electronically signed by Josee Collazo DO on 3/30/2022 at 11:45 AM

## 2023-01-25 ENCOUNTER — CLINICAL DOCUMENTATION (OUTPATIENT)
Dept: ONCOLOGY | Age: 68
End: 2023-01-25

## 2024-05-13 NOTE — PROGRESS NOTES
Referral order placed to [address] per Dr Gallito Medina per patient request. has 2 of 10 toes/Impaired gait

## (undated) DEVICE — Z DISCONTINUED NO SUB IDED TUBING ETCO2 AD L6.5FT NSL ORAL CVD PRNG NONFLARED TIP OVR

## (undated) DEVICE — FORCEPS BX L240CM JAW DIA2.8MM L CAP W/ NDL MIC MESH TOOTH